# Patient Record
Sex: MALE | Race: WHITE | NOT HISPANIC OR LATINO
[De-identification: names, ages, dates, MRNs, and addresses within clinical notes are randomized per-mention and may not be internally consistent; named-entity substitution may affect disease eponyms.]

---

## 2017-01-12 ENCOUNTER — APPOINTMENT (OUTPATIENT)
Dept: NEUROLOGY | Facility: CLINIC | Age: 82
End: 2017-01-12

## 2017-01-12 ENCOUNTER — LABORATORY RESULT (OUTPATIENT)
Age: 82
End: 2017-01-12

## 2017-01-12 VITALS
HEIGHT: 72 IN | DIASTOLIC BLOOD PRESSURE: 83 MMHG | BODY MASS INDEX: 25.33 KG/M2 | SYSTOLIC BLOOD PRESSURE: 137 MMHG | OXYGEN SATURATION: 97 % | HEART RATE: 93 BPM | WEIGHT: 187 LBS

## 2017-01-12 DIAGNOSIS — Z86.39 PERSONAL HISTORY OF OTHER ENDOCRINE, NUTRITIONAL AND METABOLIC DISEASE: ICD-10-CM

## 2017-01-12 DIAGNOSIS — E83.119 HEMOCHROMATOSIS, UNSPECIFIED: ICD-10-CM

## 2017-01-12 DIAGNOSIS — Z86.2 PERSONAL HISTORY OF DISEASES OF THE BLOOD AND BLOOD-FORMING ORGANS AND CERTAIN DISORDERS INVOLVING THE IMMUNE MECHANISM: ICD-10-CM

## 2017-01-12 DIAGNOSIS — Z87.891 PERSONAL HISTORY OF NICOTINE DEPENDENCE: ICD-10-CM

## 2017-01-12 DIAGNOSIS — A04.7 ENTEROCOLITIS DUE TO CLOSTRIDIUM DIFFICILE: ICD-10-CM

## 2017-01-12 DIAGNOSIS — Z87.39 PERSONAL HISTORY OF OTHER DISEASES OF THE MUSCULOSKELETAL SYSTEM AND CONNECTIVE TISSUE: ICD-10-CM

## 2017-01-12 RX ORDER — TAMSULOSIN HYDROCHLORIDE 0.4 MG/1
0.4 CAPSULE ORAL
Refills: 0 | Status: ACTIVE | COMMUNITY

## 2017-01-13 LAB
ALBUMIN MFR SERPL ELPH: 61.4 %
ALBUMIN SERPL ELPH-MCNC: 4.3 G/DL
ALBUMIN SERPL-MCNC: 4.1 G/DL
ALBUMIN/GLOB SERPL: 1.6 RATIO
ALP BLD-CCNC: 73 U/L
ALPHA1 GLOB MFR SERPL ELPH: 4.6 %
ALPHA1 GLOB SERPL ELPH-MCNC: 0.3 G/DL
ALPHA2 GLOB MFR SERPL ELPH: 9 %
ALPHA2 GLOB SERPL ELPH-MCNC: 0.6 G/DL
ALT SERPL-CCNC: 32 U/L
ANION GAP SERPL CALC-SCNC: 16 MMOL/L
AST SERPL-CCNC: 49 U/L
B BURGDOR IGG+IGM SER QL IB: NORMAL
B-GLOBULIN MFR SERPL ELPH: 7.4 %
B-GLOBULIN SERPL ELPH-MCNC: 0.5 G/DL
BASOPHILS # BLD AUTO: 0.12 K/UL
BASOPHILS NFR BLD AUTO: 0.9 %
BILIRUB SERPL-MCNC: 1 MG/DL
BUN SERPL-MCNC: 26 MG/DL
CALCIUM SERPL-MCNC: 9.7 MG/DL
CHLORIDE SERPL-SCNC: 102 MMOL/L
CO2 SERPL-SCNC: 25 MMOL/L
CREAT SERPL-MCNC: 1.32 MG/DL
EOSINOPHIL # BLD AUTO: 0 K/UL
EOSINOPHIL NFR BLD AUTO: 0 %
FOLATE SERPL-MCNC: >20 NG/ML
GAMMA GLOB FLD ELPH-MCNC: 1.2 G/DL
GAMMA GLOB MFR SERPL ELPH: 17.6 %
GLUCOSE SERPL-MCNC: 108 MG/DL
HBA1C MFR BLD HPLC: 5.6 %
HCT VFR BLD CALC: 33.1 %
HGB BLD-MCNC: 10.4 G/DL
INTERPRETATION SERPL IEP-IMP: NORMAL
LYMPHOCYTES # BLD AUTO: 1.64 K/UL
LYMPHOCYTES NFR BLD AUTO: 12.7 %
M PROTEIN MFR SERPL ELPH: NORMAL %
M PROTEIN SPEC IFE-MCNC: NORMAL
MAN DIFF?: NORMAL
MCHC RBC-ENTMCNC: 30.6 PG
MCHC RBC-ENTMCNC: 31.4 GM/DL
MCV RBC AUTO: 97.4 FL
MONOCLON BAND OBS SERPL: NORMAL G/DL
MONOCYTES # BLD AUTO: 0.82 K/UL
MONOCYTES NFR BLD AUTO: 6.4 %
NEUTROPHILS # BLD AUTO: 10.2 K/UL
NEUTROPHILS NFR BLD AUTO: 70 %
PLATELET # BLD AUTO: 305 K/UL
POTASSIUM SERPL-SCNC: 4.9 MMOL/L
PROT SERPL-MCNC: 6.7 G/DL
RBC # BLD: 3.4 M/UL
RBC # FLD: 22.4 %
SODIUM SERPL-SCNC: 143 MMOL/L
T PALLIDUM AB SER QL IA: NEGATIVE
T3 SERPL-MCNC: 79 NG/DL
T3RU NFR SERPL: 0.92 INDEX
T4 SERPL-MCNC: 6.2 UG/DL
TSH SERPL-ACNC: 4.41 UIU/ML
VIT B12 SERPL-MCNC: 1639 PG/ML
WBC # FLD AUTO: 12.89 K/UL

## 2017-03-30 ENCOUNTER — APPOINTMENT (OUTPATIENT)
Dept: NEUROLOGY | Facility: CLINIC | Age: 82
End: 2017-03-30

## 2017-03-30 VITALS — SYSTOLIC BLOOD PRESSURE: 132 MMHG | DIASTOLIC BLOOD PRESSURE: 82 MMHG | OXYGEN SATURATION: 96 % | HEART RATE: 87 BPM

## 2017-03-30 DIAGNOSIS — T38.0X1A POISONING BY GLUCOCORTICOIDS AND SYNTHETIC ANALOGUES, ACCIDENTAL (UNINTENTIONAL), INITIAL ENCOUNTER: ICD-10-CM

## 2017-03-30 DIAGNOSIS — G60.9 HEREDITARY AND IDIOPATHIC NEUROPATHY, UNSPECIFIED: ICD-10-CM

## 2017-03-30 DIAGNOSIS — G72.0 POISONING BY GLUCOCORTICOIDS AND SYNTHETIC ANALOGUES, ACCIDENTAL (UNINTENTIONAL), INITIAL ENCOUNTER: ICD-10-CM

## 2017-03-30 RX ORDER — GABAPENTIN 100 MG/1
100 CAPSULE ORAL
Qty: 90 | Refills: 3 | Status: ACTIVE | COMMUNITY
Start: 2017-01-12 | End: 1900-01-01

## 2017-03-30 RX ORDER — LEVOTHYROXINE SODIUM 75 UG/1
75 TABLET ORAL
Refills: 0 | Status: ACTIVE | COMMUNITY

## 2017-04-11 ENCOUNTER — APPOINTMENT (OUTPATIENT)
Dept: SPINE | Facility: CLINIC | Age: 82
End: 2017-04-11

## 2017-04-11 VITALS
BODY MASS INDEX: 26.28 KG/M2 | HEIGHT: 72 IN | DIASTOLIC BLOOD PRESSURE: 70 MMHG | SYSTOLIC BLOOD PRESSURE: 124 MMHG | WEIGHT: 194 LBS

## 2017-04-11 RX ORDER — MULTIVITAMIN
TABLET ORAL
Refills: 0 | Status: ACTIVE | COMMUNITY

## 2017-04-11 RX ORDER — CHROMIUM 200 MCG
TABLET ORAL
Refills: 0 | Status: ACTIVE | COMMUNITY

## 2017-04-11 RX ORDER — ASCORBIC ACID 500 MG
TABLET ORAL
Refills: 0 | Status: ACTIVE | COMMUNITY

## 2017-04-11 RX ORDER — METRONIDAZOLE 500 MG/1
TABLET ORAL
Refills: 0 | Status: DISCONTINUED | COMMUNITY
End: 2017-04-11

## 2017-04-11 RX ORDER — PREDNISONE 5 MG/1
5 TABLET ORAL
Refills: 0 | Status: DISCONTINUED | COMMUNITY
End: 2017-04-11

## 2017-04-11 RX ORDER — CHOLESTYRAMINE 4 G/9G
POWDER, FOR SUSPENSION ORAL
Refills: 0 | Status: DISCONTINUED | COMMUNITY
End: 2017-04-11

## 2017-04-28 ENCOUNTER — OUTPATIENT (OUTPATIENT)
Dept: OUTPATIENT SERVICES | Facility: HOSPITAL | Age: 82
LOS: 1 days | End: 2017-04-28
Payer: MEDICARE

## 2017-04-28 VITALS
RESPIRATION RATE: 17 BRPM | TEMPERATURE: 97 F | DIASTOLIC BLOOD PRESSURE: 62 MMHG | HEIGHT: 70 IN | HEART RATE: 87 BPM | WEIGHT: 186.07 LBS | SYSTOLIC BLOOD PRESSURE: 130 MMHG | OXYGEN SATURATION: 98 %

## 2017-04-28 DIAGNOSIS — E83.119 HEMOCHROMATOSIS, UNSPECIFIED: ICD-10-CM

## 2017-04-28 DIAGNOSIS — G95.9 DISEASE OF SPINAL CORD, UNSPECIFIED: ICD-10-CM

## 2017-04-28 DIAGNOSIS — Z01.818 ENCOUNTER FOR OTHER PREPROCEDURAL EXAMINATION: ICD-10-CM

## 2017-04-28 DIAGNOSIS — E03.9 HYPOTHYROIDISM, UNSPECIFIED: ICD-10-CM

## 2017-04-28 DIAGNOSIS — Z90.89 ACQUIRED ABSENCE OF OTHER ORGANS: Chronic | ICD-10-CM

## 2017-04-28 DIAGNOSIS — M50.90 CERVICAL DISC DISORDER, UNSPECIFIED, UNSPECIFIED CERVICAL REGION: ICD-10-CM

## 2017-04-28 LAB
ANION GAP SERPL CALC-SCNC: 16 MMOL/L — SIGNIFICANT CHANGE UP (ref 5–17)
BLD GP AB SCN SERPL QL: NEGATIVE — SIGNIFICANT CHANGE UP
BUN SERPL-MCNC: 24 MG/DL — HIGH (ref 7–23)
CALCIUM SERPL-MCNC: 10.2 MG/DL — SIGNIFICANT CHANGE UP (ref 8.4–10.5)
CHLORIDE SERPL-SCNC: 103 MMOL/L — SIGNIFICANT CHANGE UP (ref 96–108)
CO2 SERPL-SCNC: 23 MMOL/L — SIGNIFICANT CHANGE UP (ref 22–31)
CREAT SERPL-MCNC: 1.44 MG/DL — HIGH (ref 0.5–1.3)
GLUCOSE SERPL-MCNC: 94 MG/DL — SIGNIFICANT CHANGE UP (ref 70–99)
HCT VFR BLD CALC: 32.4 % — LOW (ref 39–50)
HGB BLD-MCNC: 10.4 G/DL — LOW (ref 13–17)
MCHC RBC-ENTMCNC: 29.9 PG — SIGNIFICANT CHANGE UP (ref 27–34)
MCHC RBC-ENTMCNC: 32.1 GM/DL — SIGNIFICANT CHANGE UP (ref 32–36)
MCV RBC AUTO: 93.1 FL — SIGNIFICANT CHANGE UP (ref 80–100)
PLATELET # BLD AUTO: 266 K/UL — SIGNIFICANT CHANGE UP (ref 150–400)
POTASSIUM SERPL-MCNC: 5.2 MMOL/L — SIGNIFICANT CHANGE UP (ref 3.5–5.3)
POTASSIUM SERPL-SCNC: 5.2 MMOL/L — SIGNIFICANT CHANGE UP (ref 3.5–5.3)
RBC # BLD: 3.48 M/UL — LOW (ref 4.2–5.8)
RBC # FLD: 18.7 % — HIGH (ref 10.3–14.5)
RH IG SCN BLD-IMP: POSITIVE — SIGNIFICANT CHANGE UP
SODIUM SERPL-SCNC: 142 MMOL/L — SIGNIFICANT CHANGE UP (ref 135–145)
WBC # BLD: 7.39 K/UL — SIGNIFICANT CHANGE UP (ref 3.8–10.5)
WBC # FLD AUTO: 7.39 K/UL — SIGNIFICANT CHANGE UP (ref 3.8–10.5)

## 2017-04-28 PROCEDURE — G0463: CPT

## 2017-04-28 PROCEDURE — 86900 BLOOD TYPING SEROLOGIC ABO: CPT

## 2017-04-28 PROCEDURE — 80048 BASIC METABOLIC PNL TOTAL CA: CPT

## 2017-04-28 PROCEDURE — 85027 COMPLETE CBC AUTOMATED: CPT

## 2017-04-28 PROCEDURE — 86901 BLOOD TYPING SEROLOGIC RH(D): CPT

## 2017-04-28 PROCEDURE — 86850 RBC ANTIBODY SCREEN: CPT

## 2017-04-28 RX ORDER — CEFAZOLIN SODIUM 1 G
2000 VIAL (EA) INJECTION ONCE
Qty: 0 | Refills: 0 | Status: DISCONTINUED | OUTPATIENT
Start: 2017-05-11 | End: 2017-05-12

## 2017-04-28 NOTE — H&P PST ADULT - HISTORY OF PRESENT ILLNESS
83 y/o male H/O disease of spinal cord, Bilateral leg weakness & pain. Seen by MD. S/P MRI. Presents for C5-T1 anterior cervical  discectomy and Fusion. 81 y/o male H/O disease of cervical spinal cord, C/O Bilateral leg weakness & pain. H/O multiple falls, denies any injury , walks with walker. Seen by MD. S/P MRI. Presents for C5-T1 anterior cervical  discectomy and Fusion.    Pt and wife poor historian.

## 2017-04-28 NOTE — H&P PST ADULT - PROBLEM SELECTOR PLAN 1
C5-T1 Anterior Cervical Discectomy and Fusion  Check labs   Obtain copy of Medical eval pt is going 5/4/17

## 2017-04-28 NOTE — H&P PST ADULT - NSANTHOSAYNRD_GEN_A_CORE
No. CINTIA screening performed.  STOP BANG Legend: 0-2 = LOW Risk; 3-4 = INTERMEDIATE Risk; 5-8 = HIGH Risk

## 2017-04-28 NOTE — H&P PST ADULT - PMH
Acute gastric ulcer without hemorrhage or perforation  age 44  Benign prostatic hyperplasia, presence of lower urinary tract symptoms unspecified, unspecified morphology    Cardiac murmur    Cervical disc disease    Essential hypertension  in the past  Falls frequently    Hemochromatosis, unspecified hemochromatosis type  Pt states he has been getting Procrit injections for about 20 years, Last dose 4/27/17, Pt sees hematologist Dr Sawyer.  Hypothyroid    Kidney stones    Migraine    Neuropathy  b/l feet  OA (osteoarthritis)    Sciatica of left side

## 2017-05-11 ENCOUNTER — INPATIENT (INPATIENT)
Facility: HOSPITAL | Age: 82
LOS: 3 days | Discharge: HOME | DRG: 473 | End: 2017-05-15
Attending: NEUROLOGICAL SURGERY | Admitting: NEUROLOGICAL SURGERY
Payer: MEDICARE

## 2017-05-11 ENCOUNTER — APPOINTMENT (OUTPATIENT)
Dept: SPINE | Facility: HOSPITAL | Age: 82
End: 2017-05-11

## 2017-05-11 VITALS
WEIGHT: 188.94 LBS | SYSTOLIC BLOOD PRESSURE: 172 MMHG | HEIGHT: 72 IN | HEART RATE: 102 BPM | OXYGEN SATURATION: 97 % | DIASTOLIC BLOOD PRESSURE: 90 MMHG | TEMPERATURE: 98 F | RESPIRATION RATE: 18 BRPM

## 2017-05-11 DIAGNOSIS — G95.9 DISEASE OF SPINAL CORD, UNSPECIFIED: ICD-10-CM

## 2017-05-11 DIAGNOSIS — Z90.89 ACQUIRED ABSENCE OF OTHER ORGANS: Chronic | ICD-10-CM

## 2017-05-11 LAB — RH IG SCN BLD-IMP: POSITIVE — SIGNIFICANT CHANGE UP

## 2017-05-11 PROCEDURE — 22552 ARTHRD ANT NTRBD CERVICAL EA: CPT

## 2017-05-11 PROCEDURE — 22551 ARTHRD ANT NTRBDY CERVICAL: CPT

## 2017-05-11 PROCEDURE — 22853 INSJ BIOMECHANICAL DEVICE: CPT

## 2017-05-11 RX ORDER — DEXAMETHASONE 0.5 MG/5ML
4 ELIXIR ORAL EVERY 8 HOURS
Qty: 0 | Refills: 0 | Status: COMPLETED | OUTPATIENT
Start: 2017-05-11 | End: 2017-05-13

## 2017-05-11 RX ORDER — DOCUSATE SODIUM 100 MG
100 CAPSULE ORAL THREE TIMES A DAY
Qty: 0 | Refills: 0 | Status: DISCONTINUED | OUTPATIENT
Start: 2017-05-11 | End: 2017-05-15

## 2017-05-11 RX ORDER — HYDRALAZINE HCL 50 MG
10 TABLET ORAL ONCE
Qty: 0 | Refills: 0 | Status: COMPLETED | OUTPATIENT
Start: 2017-05-11 | End: 2017-05-11

## 2017-05-11 RX ORDER — SENNA PLUS 8.6 MG/1
2 TABLET ORAL AT BEDTIME
Qty: 0 | Refills: 0 | Status: DISCONTINUED | OUTPATIENT
Start: 2017-05-11 | End: 2017-05-15

## 2017-05-11 RX ORDER — LABETALOL HCL 100 MG
10 TABLET ORAL ONCE
Qty: 0 | Refills: 0 | Status: COMPLETED | OUTPATIENT
Start: 2017-05-11 | End: 2017-05-11

## 2017-05-11 RX ORDER — ACETAMINOPHEN 500 MG
650 TABLET ORAL EVERY 6 HOURS
Qty: 0 | Refills: 0 | Status: DISCONTINUED | OUTPATIENT
Start: 2017-05-11 | End: 2017-05-15

## 2017-05-11 RX ORDER — HYDROMORPHONE HYDROCHLORIDE 2 MG/ML
1 INJECTION INTRAMUSCULAR; INTRAVENOUS; SUBCUTANEOUS EVERY 4 HOURS
Qty: 0 | Refills: 0 | Status: DISCONTINUED | OUTPATIENT
Start: 2017-05-11 | End: 2017-05-13

## 2017-05-11 RX ORDER — SODIUM CHLORIDE 9 MG/ML
3 INJECTION INTRAMUSCULAR; INTRAVENOUS; SUBCUTANEOUS EVERY 8 HOURS
Qty: 0 | Refills: 0 | Status: DISCONTINUED | OUTPATIENT
Start: 2017-05-11 | End: 2017-05-11

## 2017-05-11 RX ORDER — CEFAZOLIN SODIUM 1 G
2000 VIAL (EA) INJECTION EVERY 8 HOURS
Qty: 0 | Refills: 0 | Status: COMPLETED | OUTPATIENT
Start: 2017-05-11 | End: 2017-05-12

## 2017-05-11 RX ORDER — ONDANSETRON 8 MG/1
4 TABLET, FILM COATED ORAL ONCE
Qty: 0 | Refills: 0 | Status: DISCONTINUED | OUTPATIENT
Start: 2017-05-11 | End: 2017-05-12

## 2017-05-11 RX ORDER — ACETAMINOPHEN 500 MG
650 TABLET ORAL EVERY 6 HOURS
Qty: 0 | Refills: 0 | Status: DISCONTINUED | OUTPATIENT
Start: 2017-05-11 | End: 2017-05-12

## 2017-05-11 RX ORDER — HYDROMORPHONE HYDROCHLORIDE 2 MG/ML
0.25 INJECTION INTRAMUSCULAR; INTRAVENOUS; SUBCUTANEOUS
Qty: 0 | Refills: 0 | Status: DISCONTINUED | OUTPATIENT
Start: 2017-05-11 | End: 2017-05-12

## 2017-05-11 RX ORDER — DEXTROSE MONOHYDRATE, SODIUM CHLORIDE, AND POTASSIUM CHLORIDE 50; .745; 4.5 G/1000ML; G/1000ML; G/1000ML
1000 INJECTION, SOLUTION INTRAVENOUS
Qty: 0 | Refills: 0 | Status: DISCONTINUED | OUTPATIENT
Start: 2017-05-11 | End: 2017-05-13

## 2017-05-11 RX ORDER — TAMSULOSIN HYDROCHLORIDE 0.4 MG/1
0.4 CAPSULE ORAL AT BEDTIME
Qty: 0 | Refills: 0 | Status: DISCONTINUED | OUTPATIENT
Start: 2017-05-11 | End: 2017-05-15

## 2017-05-11 RX ORDER — LEVOTHYROXINE SODIUM 125 MCG
75 TABLET ORAL DAILY
Qty: 0 | Refills: 0 | Status: DISCONTINUED | OUTPATIENT
Start: 2017-05-11 | End: 2017-05-15

## 2017-05-11 RX ADMIN — HYDROMORPHONE HYDROCHLORIDE 0.25 MILLIGRAM(S): 2 INJECTION INTRAMUSCULAR; INTRAVENOUS; SUBCUTANEOUS at 16:45

## 2017-05-11 RX ADMIN — DEXTROSE MONOHYDRATE, SODIUM CHLORIDE, AND POTASSIUM CHLORIDE 75 MILLILITER(S): 50; .745; 4.5 INJECTION, SOLUTION INTRAVENOUS at 18:00

## 2017-05-11 RX ADMIN — Medication 100 MILLIGRAM(S): at 22:10

## 2017-05-11 RX ADMIN — TAMSULOSIN HYDROCHLORIDE 0.4 MILLIGRAM(S): 0.4 CAPSULE ORAL at 22:10

## 2017-05-11 RX ADMIN — SENNA PLUS 2 TABLET(S): 8.6 TABLET ORAL at 22:10

## 2017-05-11 RX ADMIN — HYDROMORPHONE HYDROCHLORIDE 0.25 MILLIGRAM(S): 2 INJECTION INTRAMUSCULAR; INTRAVENOUS; SUBCUTANEOUS at 17:00

## 2017-05-11 RX ADMIN — Medication 10 MILLIGRAM(S): at 19:45

## 2017-05-11 RX ADMIN — Medication 4 MILLIGRAM(S): at 22:10

## 2017-05-11 RX ADMIN — Medication 10 MILLIGRAM(S): at 18:10

## 2017-05-11 RX ADMIN — Medication 10 MILLIGRAM(S): at 23:28

## 2017-05-11 RX ADMIN — Medication 100 MILLIGRAM(S): at 20:00

## 2017-05-12 ENCOUNTER — TRANSCRIPTION ENCOUNTER (OUTPATIENT)
Age: 82
End: 2017-05-12

## 2017-05-12 LAB
BUN SERPL-MCNC: 25 MG/DL — HIGH (ref 7–23)
CALCIUM SERPL-MCNC: 9.1 MG/DL — SIGNIFICANT CHANGE UP (ref 8.4–10.5)
CHLORIDE SERPL-SCNC: 105 MMOL/L — SIGNIFICANT CHANGE UP (ref 96–108)
CO2 SERPL-SCNC: 21 MMOL/L — LOW (ref 22–31)
CREAT SERPL-MCNC: 1.28 MG/DL — SIGNIFICANT CHANGE UP (ref 0.5–1.3)
GLUCOSE SERPL-MCNC: 161 MG/DL — HIGH (ref 70–99)
HCT VFR BLD CALC: 32.4 % — LOW (ref 39–50)
HGB BLD-MCNC: 10.8 G/DL — LOW (ref 13–17)
MAGNESIUM SERPL-MCNC: 1.8 MG/DL — SIGNIFICANT CHANGE UP (ref 1.6–2.6)
MCHC RBC-ENTMCNC: 32.2 PG — SIGNIFICANT CHANGE UP (ref 27–34)
MCHC RBC-ENTMCNC: 33.4 GM/DL — SIGNIFICANT CHANGE UP (ref 32–36)
MCV RBC AUTO: 96.5 FL — SIGNIFICANT CHANGE UP (ref 80–100)
PHOSPHATE SERPL-MCNC: 4.2 MG/DL — SIGNIFICANT CHANGE UP (ref 2.5–4.5)
PLATELET # BLD AUTO: 151 K/UL — SIGNIFICANT CHANGE UP (ref 150–400)
POTASSIUM SERPL-MCNC: 4.7 MMOL/L — SIGNIFICANT CHANGE UP (ref 3.5–5.3)
POTASSIUM SERPL-SCNC: 4.7 MMOL/L — SIGNIFICANT CHANGE UP (ref 3.5–5.3)
RBC # BLD: 3.35 M/UL — LOW (ref 4.2–5.8)
RBC # FLD: 19.1 % — HIGH (ref 10.3–14.5)
SODIUM SERPL-SCNC: 142 MMOL/L — SIGNIFICANT CHANGE UP (ref 135–145)
WBC # BLD: 3.6 K/UL — LOW (ref 3.8–10.5)
WBC # FLD AUTO: 3.6 K/UL — LOW (ref 3.8–10.5)

## 2017-05-12 PROCEDURE — 99233 SBSQ HOSP IP/OBS HIGH 50: CPT

## 2017-05-12 RX ORDER — INSULIN LISPRO 100/ML
VIAL (ML) SUBCUTANEOUS
Qty: 0 | Refills: 0 | Status: DISCONTINUED | OUTPATIENT
Start: 2017-05-12 | End: 2017-05-13

## 2017-05-12 RX ORDER — HEPARIN SODIUM 5000 [USP'U]/ML
5000 INJECTION INTRAVENOUS; SUBCUTANEOUS EVERY 12 HOURS
Qty: 0 | Refills: 0 | Status: DISCONTINUED | OUTPATIENT
Start: 2017-05-12 | End: 2017-05-15

## 2017-05-12 RX ADMIN — Medication 100 MILLIGRAM(S): at 15:11

## 2017-05-12 RX ADMIN — Medication 4 MILLIGRAM(S): at 06:04

## 2017-05-12 RX ADMIN — HYDROMORPHONE HYDROCHLORIDE 0.25 MILLIGRAM(S): 2 INJECTION INTRAMUSCULAR; INTRAVENOUS; SUBCUTANEOUS at 03:37

## 2017-05-12 RX ADMIN — Medication 100 MILLIGRAM(S): at 06:09

## 2017-05-12 RX ADMIN — HYDROMORPHONE HYDROCHLORIDE 0.25 MILLIGRAM(S): 2 INJECTION INTRAMUSCULAR; INTRAVENOUS; SUBCUTANEOUS at 03:53

## 2017-05-12 RX ADMIN — Medication 4 MILLIGRAM(S): at 21:38

## 2017-05-12 RX ADMIN — SENNA PLUS 2 TABLET(S): 8.6 TABLET ORAL at 21:38

## 2017-05-12 RX ADMIN — Medication 75 MICROGRAM(S): at 06:04

## 2017-05-12 RX ADMIN — Medication 100 MILLIGRAM(S): at 21:38

## 2017-05-12 RX ADMIN — Medication 100 MILLIGRAM(S): at 04:10

## 2017-05-12 RX ADMIN — DEXTROSE MONOHYDRATE, SODIUM CHLORIDE, AND POTASSIUM CHLORIDE 75 MILLILITER(S): 50; .745; 4.5 INJECTION, SOLUTION INTRAVENOUS at 09:23

## 2017-05-12 RX ADMIN — Medication 4 MILLIGRAM(S): at 15:11

## 2017-05-12 RX ADMIN — TAMSULOSIN HYDROCHLORIDE 0.4 MILLIGRAM(S): 0.4 CAPSULE ORAL at 21:38

## 2017-05-12 RX ADMIN — HEPARIN SODIUM 5000 UNIT(S): 5000 INJECTION INTRAVENOUS; SUBCUTANEOUS at 18:30

## 2017-05-12 NOTE — PHYSICAL THERAPY INITIAL EVALUATION ADULT - ADDITIONAL COMMENTS
Pt resides in private home with wife.  He and his wife reside on the 1st floor (1 TENA) and his daughter and grandchild live upstairs.  PTA pt was ambulating with a RW.  He also owns a RW. Pt resides in private home with wife.  He and his wife reside on the 1st floor (1 TENA) and his daughter and grandchild live upstairs.  PTA pt was ambulating with a RW.  He also owns a cane.

## 2017-05-12 NOTE — PHYSICAL THERAPY INITIAL EVALUATION ADULT - PERTINENT HX OF CURRENT PROBLEM, REHAB EVAL
83 y/o male H/O disease of cervical spinal cord, C/O Bilateral leg weakness & pain. H/O multiple falls, denies any injury , walks with walker. Seen by MD. S/P MRI. Presents for C5-T1 anterior cervical  discectomy and Fusion.

## 2017-05-13 LAB
ANION GAP SERPL CALC-SCNC: 12 MMOL/L — SIGNIFICANT CHANGE UP (ref 5–17)
BUN SERPL-MCNC: 31 MG/DL — HIGH (ref 7–23)
CALCIUM SERPL-MCNC: 9.4 MG/DL — SIGNIFICANT CHANGE UP (ref 8.4–10.5)
CHLORIDE SERPL-SCNC: 107 MMOL/L — SIGNIFICANT CHANGE UP (ref 96–108)
CO2 SERPL-SCNC: 20 MMOL/L — LOW (ref 22–31)
CREAT SERPL-MCNC: 1.21 MG/DL — SIGNIFICANT CHANGE UP (ref 0.5–1.3)
GLUCOSE SERPL-MCNC: 123 MG/DL — HIGH (ref 70–99)
HCT VFR BLD CALC: 31.9 % — LOW (ref 39–50)
HGB BLD-MCNC: 10.4 G/DL — LOW (ref 13–17)
MCHC RBC-ENTMCNC: 30 PG — SIGNIFICANT CHANGE UP (ref 27–34)
MCHC RBC-ENTMCNC: 32.6 GM/DL — SIGNIFICANT CHANGE UP (ref 32–36)
MCV RBC AUTO: 91.9 FL — SIGNIFICANT CHANGE UP (ref 80–100)
PLATELET # BLD AUTO: 197 K/UL — SIGNIFICANT CHANGE UP (ref 150–400)
POTASSIUM SERPL-MCNC: 4.7 MMOL/L — SIGNIFICANT CHANGE UP (ref 3.5–5.3)
POTASSIUM SERPL-SCNC: 4.7 MMOL/L — SIGNIFICANT CHANGE UP (ref 3.5–5.3)
RBC # BLD: 3.47 M/UL — LOW (ref 4.2–5.8)
RBC # FLD: 18.3 % — HIGH (ref 10.3–14.5)
SODIUM SERPL-SCNC: 139 MMOL/L — SIGNIFICANT CHANGE UP (ref 135–145)
WBC # BLD: 11.02 K/UL — HIGH (ref 3.8–10.5)
WBC # FLD AUTO: 11.02 K/UL — HIGH (ref 3.8–10.5)

## 2017-05-13 RX ORDER — SODIUM CHLORIDE 9 MG/ML
1000 INJECTION INTRAMUSCULAR; INTRAVENOUS; SUBCUTANEOUS
Qty: 0 | Refills: 0 | Status: DISCONTINUED | OUTPATIENT
Start: 2017-05-13 | End: 2017-05-14

## 2017-05-13 RX ORDER — SODIUM CHLORIDE 9 MG/ML
1000 INJECTION INTRAMUSCULAR; INTRAVENOUS; SUBCUTANEOUS
Qty: 0 | Refills: 0 | Status: DISCONTINUED | OUTPATIENT
Start: 2017-05-13 | End: 2017-05-13

## 2017-05-13 RX ADMIN — Medication 4 MILLIGRAM(S): at 05:40

## 2017-05-13 RX ADMIN — TAMSULOSIN HYDROCHLORIDE 0.4 MILLIGRAM(S): 0.4 CAPSULE ORAL at 22:35

## 2017-05-13 RX ADMIN — HEPARIN SODIUM 5000 UNIT(S): 5000 INJECTION INTRAVENOUS; SUBCUTANEOUS at 05:40

## 2017-05-13 RX ADMIN — Medication 100 MILLIGRAM(S): at 22:35

## 2017-05-13 RX ADMIN — Medication 100 MILLIGRAM(S): at 13:32

## 2017-05-13 RX ADMIN — Medication 4 MILLIGRAM(S): at 13:32

## 2017-05-13 RX ADMIN — Medication 75 MICROGRAM(S): at 05:40

## 2017-05-13 RX ADMIN — SENNA PLUS 2 TABLET(S): 8.6 TABLET ORAL at 22:35

## 2017-05-13 RX ADMIN — Medication 100 MILLIGRAM(S): at 05:40

## 2017-05-13 RX ADMIN — HEPARIN SODIUM 5000 UNIT(S): 5000 INJECTION INTRAVENOUS; SUBCUTANEOUS at 18:55

## 2017-05-13 RX ADMIN — DEXTROSE MONOHYDRATE, SODIUM CHLORIDE, AND POTASSIUM CHLORIDE 75 MILLILITER(S): 50; .745; 4.5 INJECTION, SOLUTION INTRAVENOUS at 13:32

## 2017-05-14 LAB
ANION GAP SERPL CALC-SCNC: 11 MMOL/L — SIGNIFICANT CHANGE UP (ref 5–17)
BUN SERPL-MCNC: 35 MG/DL — HIGH (ref 7–23)
CALCIUM SERPL-MCNC: 9.4 MG/DL — SIGNIFICANT CHANGE UP (ref 8.4–10.5)
CHLORIDE SERPL-SCNC: 108 MMOL/L — SIGNIFICANT CHANGE UP (ref 96–108)
CO2 SERPL-SCNC: 22 MMOL/L — SIGNIFICANT CHANGE UP (ref 22–31)
CREAT SERPL-MCNC: 1.15 MG/DL — SIGNIFICANT CHANGE UP (ref 0.5–1.3)
GLUCOSE SERPL-MCNC: 81 MG/DL — SIGNIFICANT CHANGE UP (ref 70–99)
POTASSIUM SERPL-MCNC: 4.2 MMOL/L — SIGNIFICANT CHANGE UP (ref 3.5–5.3)
POTASSIUM SERPL-SCNC: 4.2 MMOL/L — SIGNIFICANT CHANGE UP (ref 3.5–5.3)
SODIUM SERPL-SCNC: 141 MMOL/L — SIGNIFICANT CHANGE UP (ref 135–145)

## 2017-05-14 RX ADMIN — Medication 100 MILLIGRAM(S): at 05:31

## 2017-05-14 RX ADMIN — HEPARIN SODIUM 5000 UNIT(S): 5000 INJECTION INTRAVENOUS; SUBCUTANEOUS at 17:57

## 2017-05-14 RX ADMIN — Medication 75 MICROGRAM(S): at 05:31

## 2017-05-14 RX ADMIN — TAMSULOSIN HYDROCHLORIDE 0.4 MILLIGRAM(S): 0.4 CAPSULE ORAL at 21:02

## 2017-05-14 RX ADMIN — Medication 100 MILLIGRAM(S): at 13:47

## 2017-05-14 RX ADMIN — HEPARIN SODIUM 5000 UNIT(S): 5000 INJECTION INTRAVENOUS; SUBCUTANEOUS at 05:31

## 2017-05-15 ENCOUNTER — TRANSCRIPTION ENCOUNTER (OUTPATIENT)
Age: 82
End: 2017-05-15

## 2017-05-15 VITALS
DIASTOLIC BLOOD PRESSURE: 77 MMHG | TEMPERATURE: 98 F | OXYGEN SATURATION: 100 % | SYSTOLIC BLOOD PRESSURE: 110 MMHG | RESPIRATION RATE: 16 BRPM | HEART RATE: 88 BPM

## 2017-05-15 PROCEDURE — C1769: CPT

## 2017-05-15 PROCEDURE — 76000 FLUOROSCOPY <1 HR PHYS/QHP: CPT

## 2017-05-15 PROCEDURE — C1713: CPT

## 2017-05-15 PROCEDURE — 84100 ASSAY OF PHOSPHORUS: CPT

## 2017-05-15 PROCEDURE — 85027 COMPLETE CBC AUTOMATED: CPT

## 2017-05-15 PROCEDURE — 86901 BLOOD TYPING SEROLOGIC RH(D): CPT

## 2017-05-15 PROCEDURE — 97161 PT EVAL LOW COMPLEX 20 MIN: CPT

## 2017-05-15 PROCEDURE — C1889: CPT

## 2017-05-15 PROCEDURE — 86900 BLOOD TYPING SEROLOGIC ABO: CPT

## 2017-05-15 PROCEDURE — 83735 ASSAY OF MAGNESIUM: CPT

## 2017-05-15 PROCEDURE — 80048 BASIC METABOLIC PNL TOTAL CA: CPT

## 2017-05-15 RX ORDER — SENNA PLUS 8.6 MG/1
2 TABLET ORAL
Qty: 0 | Refills: 0 | COMMUNITY
Start: 2017-05-15

## 2017-05-15 RX ORDER — DOCUSATE SODIUM 100 MG
1 CAPSULE ORAL
Qty: 0 | Refills: 0 | COMMUNITY
Start: 2017-05-15

## 2017-05-15 RX ORDER — OXYCODONE HYDROCHLORIDE 5 MG/1
1 TABLET ORAL
Qty: 42 | Refills: 0 | OUTPATIENT
Start: 2017-05-15 | End: 2017-05-22

## 2017-05-15 RX ORDER — ACETAMINOPHEN 500 MG
2 TABLET ORAL
Qty: 0 | Refills: 0 | COMMUNITY
Start: 2017-05-15

## 2017-05-15 RX ORDER — ACETAMINOPHEN 500 MG
2 TABLET ORAL
Qty: 0 | Refills: 0 | COMMUNITY

## 2017-05-15 RX ORDER — AMLODIPINE BESYLATE 2.5 MG/1
2.5 TABLET ORAL DAILY
Qty: 0 | Refills: 0 | Status: DISCONTINUED | OUTPATIENT
Start: 2017-05-15 | End: 2017-05-15

## 2017-05-15 RX ADMIN — Medication 75 MICROGRAM(S): at 05:47

## 2017-05-15 RX ADMIN — HEPARIN SODIUM 5000 UNIT(S): 5000 INJECTION INTRAVENOUS; SUBCUTANEOUS at 05:49

## 2017-05-15 RX ADMIN — Medication 100 MILLIGRAM(S): at 05:47

## 2017-05-15 NOTE — DISCHARGE NOTE ADULT - MEDICATION SUMMARY - MEDICATIONS TO TAKE
I will START or STAY ON the medications listed below when I get home from the hospital:    acetaminophen 325 mg oral tablet  -- 2 tab(s) by mouth every 6 hours, As needed, Mild Pain (1 - 3)  -- Indication: For pain    acetaminophen-oxycodone 325 mg-5 mg oral tablet  -- 1 tab(s) by mouth every 4 hours, As needed, Moderate Pain MDD:6  -- Indication: For pain    tamsulosin 0.4 mg oral capsule  -- 1 cap(s) by mouth once a day (at bedtime)  -- Indication: For urinary retention    Procrit 40,000 units/mL preservative-free injectable solution  --  injectable every 2 weeks   -- Indication: For Blood agent     docusate sodium 100 mg oral capsule  -- 1 cap(s) by mouth 3 times a day  -- Indication: For Stool softner     senna oral tablet  -- 2 tab(s) by mouth once a day (at bedtime)  -- Indication: For Stool softner     levothyroxine 75 mcg (0.075 mg) oral capsule  -- 1 cap(s) by mouth once a day  -- Indication: For thyroid

## 2017-05-15 NOTE — DISCHARGE NOTE ADULT - ADDITIONAL INSTRUCTIONS
may take shower 4 days after surgery. Let water run over the surgical site and pat dry after shower.   If notices any new weakness, numbness, tingling, fever severe, neck swelling, difficulty swallowing,  pain then please come back to the hospital.

## 2017-05-15 NOTE — DISCHARGE NOTE ADULT - HOSPITAL COURSE
Patient had c5-t1 anterior cervical discectomy and fusion done on 5/11. Post op patient was admitted to the pacu and was then transferred to floor. Patient was given pain meds, ivf and was started back on routine home meds along with DVT prophylaxis. Post op patient was seen by physical therapy and was cleared for discharge. Patient's hemovac drain was dcd on 5/14. Patient was discharged home with follow up instructions.

## 2017-05-15 NOTE — DISCHARGE NOTE ADULT - REASON FOR ADMISSION
Spinal Stenosis in my neck Admitted on 5/11 with history of b/l leg weakness and pain. 5/11 patient had c5-t1 anterior cervical discectomy and fusion.

## 2017-05-15 NOTE — DISCHARGE NOTE ADULT - PLAN OF CARE
increase activity follow up with Dr. Sharma in 10 days-Please call Neurosurgeon office to confirm appointment   Follow up with primary care provider in 2 weeks

## 2017-05-15 NOTE — DISCHARGE NOTE ADULT - NS AS ACTIVITY OBS
Do not make important decisions/Walking-Indoors allowed/No Heavy lifting/straining/Walking-Outdoors allowed/Stairs allowed/Showering allowed/Do not drive or operate machinery

## 2017-05-15 NOTE — DISCHARGE NOTE ADULT - SECONDARY DIAGNOSIS.
Hypothyroid Cardiac murmur Benign prostatic hyperplasia, presence of lower urinary tract symptoms unspecified, unspecified morphology

## 2017-05-15 NOTE — DISCHARGE NOTE ADULT - CARE PLAN
Principal Discharge DX:	Cervical disc disease  Goal:	increase activity  Instructions for follow-up, activity and diet:	follow up with Dr. Sharma in 10 days-Please call Neurosurgeon office to confirm appointment   Follow up with primary care provider in 2 weeks  Secondary Diagnosis:	Hypothyroid  Secondary Diagnosis:	Cardiac murmur  Secondary Diagnosis:	Benign prostatic hyperplasia, presence of lower urinary tract symptoms unspecified, unspecified morphology

## 2017-05-15 NOTE — DISCHARGE NOTE ADULT - PATIENT PORTAL LINK FT
“You can access the Follow[facility] Patient Portal, offered by [facility], by registering with the following website: [email or web address]”

## 2017-05-15 NOTE — DISCHARGE NOTE ADULT - HOME CARE AGENCY
[facility] for home P/T for safety  [facility] care for home P/T  [phone]

## 2017-05-15 NOTE — DISCHARGE NOTE ADULT - CARE PROVIDER_API CALL
Kishor Sharma (MD), Neurological Surgery  [address] [place] [id number]  Phone: [phone]  Fax: [phone]

## 2017-05-18 ENCOUNTER — INPATIENT (INPATIENT)
Facility: HOSPITAL | Age: 82
LOS: 6 days | Discharge: HOME | DRG: 862 | End: 2017-05-25
Attending: INTERNAL MEDICINE | Admitting: ORTHOPAEDIC SURGERY
Payer: MEDICARE

## 2017-05-18 VITALS
DIASTOLIC BLOOD PRESSURE: 74 MMHG | OXYGEN SATURATION: 96 % | RESPIRATION RATE: 20 BRPM | SYSTOLIC BLOOD PRESSURE: 134 MMHG | TEMPERATURE: 101 F | HEART RATE: 115 BPM

## 2017-05-18 DIAGNOSIS — S72.009A FRACTURE OF UNSPECIFIED PART OF NECK OF UNSPECIFIED FEMUR, INITIAL ENCOUNTER FOR CLOSED FRACTURE: ICD-10-CM

## 2017-05-18 DIAGNOSIS — Z90.89 ACQUIRED ABSENCE OF OTHER ORGANS: Chronic | ICD-10-CM

## 2017-05-18 LAB
ALBUMIN SERPL ELPH-MCNC: 3.9 G/DL — SIGNIFICANT CHANGE UP (ref 3.3–5)
ALP SERPL-CCNC: 76 U/L — SIGNIFICANT CHANGE UP (ref 40–120)
ALT FLD-CCNC: 16 U/L RC — SIGNIFICANT CHANGE UP (ref 10–45)
ANION GAP SERPL CALC-SCNC: 12 MMOL/L — SIGNIFICANT CHANGE UP (ref 5–17)
ANISOCYTOSIS BLD QL: SIGNIFICANT CHANGE UP
APTT BLD: 28.1 SEC — SIGNIFICANT CHANGE UP (ref 27.5–37.4)
AST SERPL-CCNC: 25 U/L — SIGNIFICANT CHANGE UP (ref 10–40)
BASE EXCESS BLDV CALC-SCNC: 2.3 MMOL/L — HIGH (ref -2–2)
BASOPHILS # BLD AUTO: 0.1 K/UL — SIGNIFICANT CHANGE UP (ref 0–0.2)
BASOPHILS NFR BLD AUTO: 0.6 % — SIGNIFICANT CHANGE UP (ref 0–2)
BILIRUB SERPL-MCNC: 0.7 MG/DL — SIGNIFICANT CHANGE UP (ref 0.2–1.2)
BUN SERPL-MCNC: 34 MG/DL — HIGH (ref 7–23)
CA-I SERPL-SCNC: 1.31 MMOL/L — HIGH (ref 1.12–1.3)
CALCIUM SERPL-MCNC: 9.8 MG/DL — SIGNIFICANT CHANGE UP (ref 8.4–10.5)
CHLORIDE BLDV-SCNC: 104 MMOL/L — SIGNIFICANT CHANGE UP (ref 96–108)
CHLORIDE SERPL-SCNC: 103 MMOL/L — SIGNIFICANT CHANGE UP (ref 96–108)
CO2 BLDV-SCNC: 28 MMOL/L — SIGNIFICANT CHANGE UP (ref 22–30)
CO2 SERPL-SCNC: 25 MMOL/L — SIGNIFICANT CHANGE UP (ref 22–31)
CREAT SERPL-MCNC: 1.34 MG/DL — HIGH (ref 0.5–1.3)
DACRYOCYTES BLD QL SMEAR: SLIGHT — SIGNIFICANT CHANGE UP
ELLIPTOCYTES BLD QL SMEAR: SLIGHT — SIGNIFICANT CHANGE UP
EOSINOPHIL # BLD AUTO: 0 K/UL — SIGNIFICANT CHANGE UP (ref 0–0.5)
EOSINOPHIL NFR BLD AUTO: 0.3 % — SIGNIFICANT CHANGE UP (ref 0–6)
GAS PNL BLDV: 139 MMOL/L — SIGNIFICANT CHANGE UP (ref 136–145)
GAS PNL BLDV: SIGNIFICANT CHANGE UP
GAS PNL BLDV: SIGNIFICANT CHANGE UP
GLUCOSE BLDV-MCNC: 123 MG/DL — HIGH (ref 70–99)
GLUCOSE SERPL-MCNC: 123 MG/DL — HIGH (ref 70–99)
HCO3 BLDV-SCNC: 27 MMOL/L — SIGNIFICANT CHANGE UP (ref 21–29)
HCT VFR BLD CALC: 29.1 % — LOW (ref 39–50)
HCT VFR BLDA CALC: 35 % — LOW (ref 39–50)
HGB BLD CALC-MCNC: 11.3 G/DL — LOW (ref 13–17)
HGB BLD-MCNC: 9.4 G/DL — LOW (ref 13–17)
HYPOCHROMIA BLD QL: SLIGHT — SIGNIFICANT CHANGE UP
INR BLD: 1.13 RATIO — SIGNIFICANT CHANGE UP (ref 0.88–1.16)
LACTATE BLDV-MCNC: 1 MMOL/L — SIGNIFICANT CHANGE UP (ref 0.7–2)
LYMPHOCYTES # BLD AUTO: 1.3 K/UL — SIGNIFICANT CHANGE UP (ref 1–3.3)
LYMPHOCYTES # BLD AUTO: 9.3 % — LOW (ref 13–44)
MACROCYTES BLD QL: SLIGHT — SIGNIFICANT CHANGE UP
MCHC RBC-ENTMCNC: 31.2 PG — SIGNIFICANT CHANGE UP (ref 27–34)
MCHC RBC-ENTMCNC: 32.2 GM/DL — SIGNIFICANT CHANGE UP (ref 32–36)
MCV RBC AUTO: 96.8 FL — SIGNIFICANT CHANGE UP (ref 80–100)
MICROCYTES BLD QL: SLIGHT — SIGNIFICANT CHANGE UP
MONOCYTES # BLD AUTO: 1.7 K/UL — HIGH (ref 0–0.9)
MONOCYTES NFR BLD AUTO: 12.5 % — SIGNIFICANT CHANGE UP (ref 2–14)
NEUTROPHILS # BLD AUTO: 10.6 K/UL — HIGH (ref 1.8–7.4)
NEUTROPHILS NFR BLD AUTO: 77.3 % — HIGH (ref 43–77)
OVALOCYTES BLD QL SMEAR: SLIGHT — SIGNIFICANT CHANGE UP
PCO2 BLDV: 44 MMHG — SIGNIFICANT CHANGE UP (ref 35–50)
PH BLDV: 7.41 — SIGNIFICANT CHANGE UP (ref 7.35–7.45)
PLAT MORPH BLD: NORMAL — SIGNIFICANT CHANGE UP
PLATELET # BLD AUTO: 184 K/UL — SIGNIFICANT CHANGE UP (ref 150–400)
PO2 BLDV: 32 MMHG — SIGNIFICANT CHANGE UP (ref 25–45)
POLYCHROMASIA BLD QL SMEAR: SLIGHT — SIGNIFICANT CHANGE UP
POTASSIUM BLDV-SCNC: 4.2 MMOL/L — SIGNIFICANT CHANGE UP (ref 3.5–5)
POTASSIUM SERPL-MCNC: 4.4 MMOL/L — SIGNIFICANT CHANGE UP (ref 3.5–5.3)
POTASSIUM SERPL-SCNC: 4.4 MMOL/L — SIGNIFICANT CHANGE UP (ref 3.5–5.3)
PROT SERPL-MCNC: 6.5 G/DL — SIGNIFICANT CHANGE UP (ref 6–8.3)
PROTHROM AB SERPL-ACNC: 12.3 SEC — SIGNIFICANT CHANGE UP (ref 9.8–12.7)
RBC # BLD: 3 M/UL — LOW (ref 4.2–5.8)
RBC # FLD: 19.2 % — HIGH (ref 10.3–14.5)
RBC BLD AUTO: ABNORMAL
SAO2 % BLDV: 52 % — LOW (ref 67–88)
SCHISTOCYTES BLD QL AUTO: SLIGHT — SIGNIFICANT CHANGE UP
SODIUM SERPL-SCNC: 140 MMOL/L — SIGNIFICANT CHANGE UP (ref 135–145)
WBC # BLD: 13.8 K/UL — HIGH (ref 3.8–10.5)
WBC # FLD AUTO: 13.8 K/UL — HIGH (ref 3.8–10.5)

## 2017-05-18 PROCEDURE — 72125 CT NECK SPINE W/O DYE: CPT | Mod: 26

## 2017-05-18 PROCEDURE — 99285 EMERGENCY DEPT VISIT HI MDM: CPT | Mod: 25

## 2017-05-18 PROCEDURE — 93010 ELECTROCARDIOGRAM REPORT: CPT

## 2017-05-18 PROCEDURE — 70450 CT HEAD/BRAIN W/O DYE: CPT | Mod: 26

## 2017-05-18 RX ORDER — SODIUM CHLORIDE 9 MG/ML
500 INJECTION INTRAMUSCULAR; INTRAVENOUS; SUBCUTANEOUS
Qty: 0 | Refills: 0 | Status: COMPLETED | OUTPATIENT
Start: 2017-05-18 | End: 2017-05-18

## 2017-05-18 RX ORDER — SODIUM CHLORIDE 9 MG/ML
3 INJECTION INTRAMUSCULAR; INTRAVENOUS; SUBCUTANEOUS ONCE
Qty: 0 | Refills: 0 | Status: COMPLETED | OUTPATIENT
Start: 2017-05-18 | End: 2017-05-18

## 2017-05-18 RX ADMIN — SODIUM CHLORIDE 3 MILLILITER(S): 9 INJECTION INTRAMUSCULAR; INTRAVENOUS; SUBCUTANEOUS at 21:34

## 2017-05-18 RX ADMIN — SODIUM CHLORIDE 2000 MILLILITER(S): 9 INJECTION INTRAMUSCULAR; INTRAVENOUS; SUBCUTANEOUS at 22:01

## 2017-05-18 RX ADMIN — SODIUM CHLORIDE 2000 MILLILITER(S): 9 INJECTION INTRAMUSCULAR; INTRAVENOUS; SUBCUTANEOUS at 21:59

## 2017-05-18 RX ADMIN — SODIUM CHLORIDE 2000 MILLILITER(S): 9 INJECTION INTRAMUSCULAR; INTRAVENOUS; SUBCUTANEOUS at 22:00

## 2017-05-18 RX ADMIN — SODIUM CHLORIDE 2000 MILLILITER(S): 9 INJECTION INTRAMUSCULAR; INTRAVENOUS; SUBCUTANEOUS at 21:44

## 2017-05-18 RX ADMIN — SODIUM CHLORIDE 2000 MILLILITER(S): 9 INJECTION INTRAMUSCULAR; INTRAVENOUS; SUBCUTANEOUS at 21:34

## 2017-05-18 NOTE — ED ADULT NURSE NOTE - OBJECTIVE STATEMENT
82 year old male patient presents to ED c/o R shoulder pain s/p witnessed fall in the bathroom. Patient was using the walker when his knees gave out and fel fell backward. Patient hit his head on a plastic toilet seat. Denies LOC, headache, blurred vision, chest pain, SOB, abd pain, n/v/d. Patient had recent cervical disc surgery on 5/11, and was d/c home on Monday. Steri strips present to incision to L anterior neck, dry and intact. Patient reports increased weakness and tactile fevers. Denies drainage or bleeding from site.

## 2017-05-18 NOTE — ED PROVIDER NOTE - NS ED MD SCRIBE ATTENDING SCRIBE SECTIONS
DISPOSITION/REVIEW OF SYSTEMS/HIV/PHYSICAL EXAM/PAST MEDICAL/SURGICAL/SOCIAL HISTORY/HISTORY OF PRESENT ILLNESS

## 2017-05-18 NOTE — ED PROVIDER NOTE - MEDICAL DECISION MAKING DETAILS
Patient with left shoulder pain. PLAN: CT head and neck to evaluate fall r/o STH and any occult injury of the new cervical fusion. Otherwise Will obtain IV x 2, cbc, cmp, +/-ce, VBG with lactate at time 0, fluid bolus @ 30cc/kg initiated upon departure from the room on initial assessment within 30 minutes of arrival, urine analysis and blood cultures x 2 are obtained prior to antibiotics =>prior to administration of antibiotics goal within 3 hours of arrival, and will repeat lactate with VBG if the original is elevated and do so within 6 hours of initial lactate.

## 2017-05-18 NOTE — ED PROVIDER NOTE - PHYSICAL EXAMINATION
NAD, NCAT, MMM, Trachea midline, Normal conjunctiva, CTAB, Non-tachycardic, Normal perfusion, Soft, NTND, No rebound/guarding, No deformity of extremities, Appropriate, Cooperative, With capacity and insight, No rashes, CN grossly intact, Normal coordination, No focal motor or sensory deficits

## 2017-05-18 NOTE — ED PROVIDER NOTE - CONSTITUTIONAL, MLM
normal... Pale, awake, alert, oriented to person, place, time/situation and in no apparent distress.

## 2017-05-18 NOTE — ED PROVIDER NOTE - CARE PLAN
Principal Discharge DX:	Hip fracture Principal Discharge DX:	Fever  Secondary Diagnosis:	Tachycardia

## 2017-05-18 NOTE — ED PROVIDER NOTE - DETAILS:
Oscar Casarez MD note: The scribe's documentation has been prepared under my direction and personally reviewed by me.  I confirm that the note above accurately reflects my work, treatment, procedures, and medical decision making.

## 2017-05-18 NOTE — ED PROVIDER NOTE - PROGRESS NOTE DETAILS
lung deviated to the right   limited film due to rotation Oscar Casarez MD: Patient with right hip fracture, orthopedics accepts, Dr. Villafuerte of medicine aware, will admit.  Will follow up on labs, analgesia, any clinical imaging, reassess and disposition as clinically indicated.  Details of patient and plan conveyed to receiving physician and conveyed back for understanding.  There were no questions at this time about the patients disposition and plan. Patient's care to be taken over by receiving physician at this time, all decisions regarding the progression of care will be made at their discretion.

## 2017-05-18 NOTE — ED PROVIDER NOTE - OBJECTIVE STATEMENT
82 year old male with PMHx with many medical issues presents to ED c/o s/p surgery and was discharged this Monday, Admits to pain in the left shoulder. Had a fall from the shower and fell on his back. Has difficulty breathing. No cough, nausea or vomiting. Not on any blood thinners. Had LE edema which pt admits had worsen.

## 2017-05-19 DIAGNOSIS — I10 ESSENTIAL (PRIMARY) HYPERTENSION: ICD-10-CM

## 2017-05-19 DIAGNOSIS — R50.9 FEVER, UNSPECIFIED: ICD-10-CM

## 2017-05-19 DIAGNOSIS — M50.90 CERVICAL DISC DISORDER, UNSPECIFIED, UNSPECIFIED CERVICAL REGION: ICD-10-CM
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
## 2017-05-19 DIAGNOSIS — Z29.9 ENCOUNTER FOR PROPHYLACTIC MEASURES, UNSPECIFIED: ICD-10-CM

## 2017-05-19 DIAGNOSIS — N40.0 BENIGN PROSTATIC HYPERPLASIA WITHOUT LOWER URINARY TRACT SYMPTOMS: ICD-10-CM
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
## 2017-05-19 DIAGNOSIS — E83.119 HEMOCHROMATOSIS, UNSPECIFIED: ICD-10-CM

## 2017-05-19 DIAGNOSIS — E03.9 HYPOTHYROIDISM, UNSPECIFIED: ICD-10-CM

## 2017-05-19 LAB
ANION GAP SERPL CALC-SCNC: 9 MMOL/L — SIGNIFICANT CHANGE UP (ref 5–17)
APPEARANCE UR: CLEAR — SIGNIFICANT CHANGE UP
BASOPHILS # BLD AUTO: 0.1 K/UL — SIGNIFICANT CHANGE UP (ref 0–0.2)
BASOPHILS NFR BLD AUTO: 0.7 % — SIGNIFICANT CHANGE UP (ref 0–2)
BILIRUB UR-MCNC: NEGATIVE — SIGNIFICANT CHANGE UP
BUN SERPL-MCNC: 30 MG/DL — HIGH (ref 7–23)
CALCIUM SERPL-MCNC: 8.5 MG/DL — SIGNIFICANT CHANGE UP (ref 8.4–10.5)
CHLORIDE SERPL-SCNC: 106 MMOL/L — SIGNIFICANT CHANGE UP (ref 96–108)
CK MB BLD-MCNC: 8.3 % — HIGH (ref 0–3.5)
CK MB CFR SERPL CALC: 2 NG/ML — SIGNIFICANT CHANGE UP (ref 0–6.7)
CK SERPL-CCNC: 24 U/L — LOW (ref 30–200)
CO2 SERPL-SCNC: 24 MMOL/L — SIGNIFICANT CHANGE UP (ref 22–31)
COLOR SPEC: YELLOW — SIGNIFICANT CHANGE UP
CREAT SERPL-MCNC: 1.24 MG/DL — SIGNIFICANT CHANGE UP (ref 0.5–1.3)
DIFF PNL FLD: NEGATIVE — SIGNIFICANT CHANGE UP
EOSINOPHIL # BLD AUTO: 0.1 K/UL — SIGNIFICANT CHANGE UP (ref 0–0.5)
EOSINOPHIL NFR BLD AUTO: 0.7 % — SIGNIFICANT CHANGE UP (ref 0–6)
EPI CELLS # UR: SIGNIFICANT CHANGE UP /HPF
GLUCOSE SERPL-MCNC: 114 MG/DL — HIGH (ref 70–99)
GLUCOSE UR QL: NEGATIVE — SIGNIFICANT CHANGE UP
HBA1C BLD-MCNC: 5.4 % — SIGNIFICANT CHANGE UP (ref 4–5.6)
HCT VFR BLD CALC: 27 % — LOW (ref 39–50)
HGB BLD-MCNC: 9 G/DL — LOW (ref 13–17)
KETONES UR-MCNC: NEGATIVE — SIGNIFICANT CHANGE UP
LEUKOCYTE ESTERASE UR-ACNC: NEGATIVE — SIGNIFICANT CHANGE UP
LYMPHOCYTES # BLD AUTO: 15.6 % — SIGNIFICANT CHANGE UP (ref 13–44)
LYMPHOCYTES # BLD AUTO: 2.1 K/UL — SIGNIFICANT CHANGE UP (ref 1–3.3)
MAGNESIUM SERPL-MCNC: 1.8 MG/DL — SIGNIFICANT CHANGE UP (ref 1.6–2.6)
MCHC RBC-ENTMCNC: 32.5 PG — SIGNIFICANT CHANGE UP (ref 27–34)
MCHC RBC-ENTMCNC: 33.4 GM/DL — SIGNIFICANT CHANGE UP (ref 32–36)
MCV RBC AUTO: 97.4 FL — SIGNIFICANT CHANGE UP (ref 80–100)
MONOCYTES # BLD AUTO: 2.4 K/UL — HIGH (ref 0–0.9)
MONOCYTES NFR BLD AUTO: 17.8 % — HIGH (ref 2–14)
NEUTROPHILS # BLD AUTO: 8.8 K/UL — HIGH (ref 1.8–7.4)
NEUTROPHILS NFR BLD AUTO: 65.2 % — SIGNIFICANT CHANGE UP (ref 43–77)
NITRITE UR-MCNC: NEGATIVE — SIGNIFICANT CHANGE UP
PH UR: 6.5 — SIGNIFICANT CHANGE UP (ref 5–8)
PHOSPHATE SERPL-MCNC: 2.6 MG/DL — SIGNIFICANT CHANGE UP (ref 2.5–4.5)
PLATELET # BLD AUTO: 147 K/UL — LOW (ref 150–400)
POTASSIUM SERPL-MCNC: 4 MMOL/L — SIGNIFICANT CHANGE UP (ref 3.5–5.3)
POTASSIUM SERPL-SCNC: 4 MMOL/L — SIGNIFICANT CHANGE UP (ref 3.5–5.3)
PROT UR-MCNC: SIGNIFICANT CHANGE UP
RAPID RVP RESULT: SIGNIFICANT CHANGE UP
RBC # BLD: 2.77 M/UL — LOW (ref 4.2–5.8)
RBC # FLD: 18.9 % — HIGH (ref 10.3–14.5)
SODIUM SERPL-SCNC: 139 MMOL/L — SIGNIFICANT CHANGE UP (ref 135–145)
SP GR SPEC: 1.01 — SIGNIFICANT CHANGE UP (ref 1.01–1.02)
TROPONIN T SERPL-MCNC: 0.03 NG/ML — SIGNIFICANT CHANGE UP (ref 0–0.06)
UROBILINOGEN FLD QL: NEGATIVE — SIGNIFICANT CHANGE UP
WBC # BLD: 13.5 K/UL — HIGH (ref 3.8–10.5)
WBC # FLD AUTO: 13.5 K/UL — HIGH (ref 3.8–10.5)
WBC UR QL: SIGNIFICANT CHANGE UP /HPF (ref 0–5)

## 2017-05-19 PROCEDURE — 99223 1ST HOSP IP/OBS HIGH 75: CPT | Mod: GC

## 2017-05-19 PROCEDURE — 71010: CPT | Mod: 26

## 2017-05-19 PROCEDURE — 71275 CT ANGIOGRAPHY CHEST: CPT | Mod: 26

## 2017-05-19 PROCEDURE — 93970 EXTREMITY STUDY: CPT | Mod: 26

## 2017-05-19 PROCEDURE — 12345: CPT | Mod: GC,NC

## 2017-05-19 RX ORDER — ENOXAPARIN SODIUM 100 MG/ML
80 INJECTION SUBCUTANEOUS EVERY 12 HOURS
Qty: 0 | Refills: 0 | Status: DISCONTINUED | OUTPATIENT
Start: 2017-05-19 | End: 2017-05-20

## 2017-05-19 RX ORDER — DEXTROSE 50 % IN WATER 50 %
25 SYRINGE (ML) INTRAVENOUS ONCE
Qty: 0 | Refills: 0 | Status: DISCONTINUED | OUTPATIENT
Start: 2017-05-19 | End: 2017-05-21

## 2017-05-19 RX ORDER — DEXTROSE 50 % IN WATER 50 %
1 SYRINGE (ML) INTRAVENOUS ONCE
Qty: 0 | Refills: 0 | Status: DISCONTINUED | OUTPATIENT
Start: 2017-05-19 | End: 2017-05-21

## 2017-05-19 RX ORDER — PIPERACILLIN AND TAZOBACTAM 4; .5 G/20ML; G/20ML
3.38 INJECTION, POWDER, LYOPHILIZED, FOR SOLUTION INTRAVENOUS EVERY 8 HOURS
Qty: 0 | Refills: 0 | Status: DISCONTINUED | OUTPATIENT
Start: 2017-05-19 | End: 2017-05-19

## 2017-05-19 RX ORDER — SENNA PLUS 8.6 MG/1
2 TABLET ORAL AT BEDTIME
Qty: 0 | Refills: 0 | Status: DISCONTINUED | OUTPATIENT
Start: 2017-05-19 | End: 2017-05-23

## 2017-05-19 RX ORDER — TAMSULOSIN HYDROCHLORIDE 0.4 MG/1
0.4 CAPSULE ORAL AT BEDTIME
Qty: 0 | Refills: 0 | Status: DISCONTINUED | OUTPATIENT
Start: 2017-05-19 | End: 2017-05-25

## 2017-05-19 RX ORDER — POLYETHYLENE GLYCOL 3350 17 G/17G
17 POWDER, FOR SOLUTION ORAL
Qty: 0 | Refills: 0 | COMMUNITY

## 2017-05-19 RX ORDER — ACETAMINOPHEN 500 MG
650 TABLET ORAL EVERY 6 HOURS
Qty: 0 | Refills: 0 | Status: DISCONTINUED | OUTPATIENT
Start: 2017-05-19 | End: 2017-05-23

## 2017-05-19 RX ORDER — ERYTHROPOIETIN 10000 [IU]/ML
40000 INJECTION, SOLUTION INTRAVENOUS; SUBCUTANEOUS ONCE
Qty: 0 | Refills: 0 | Status: COMPLETED | OUTPATIENT
Start: 2017-05-19 | End: 2017-05-19

## 2017-05-19 RX ORDER — LEVOTHYROXINE SODIUM 125 MCG
75 TABLET ORAL DAILY
Qty: 0 | Refills: 0 | Status: DISCONTINUED | OUTPATIENT
Start: 2017-05-19 | End: 2017-05-25

## 2017-05-19 RX ORDER — INSULIN LISPRO 100/ML
VIAL (ML) SUBCUTANEOUS
Qty: 0 | Refills: 0 | Status: DISCONTINUED | OUTPATIENT
Start: 2017-05-19 | End: 2017-05-21

## 2017-05-19 RX ORDER — HEPARIN SODIUM 5000 [USP'U]/ML
5000 INJECTION INTRAVENOUS; SUBCUTANEOUS EVERY 8 HOURS
Qty: 0 | Refills: 0 | Status: DISCONTINUED | OUTPATIENT
Start: 2017-05-19 | End: 2017-05-19

## 2017-05-19 RX ORDER — SODIUM CHLORIDE 9 MG/ML
1000 INJECTION, SOLUTION INTRAVENOUS
Qty: 0 | Refills: 0 | Status: DISCONTINUED | OUTPATIENT
Start: 2017-05-19 | End: 2017-05-21

## 2017-05-19 RX ORDER — ACETAMINOPHEN 500 MG
1000 TABLET ORAL ONCE
Qty: 0 | Refills: 0 | Status: COMPLETED | OUTPATIENT
Start: 2017-05-19 | End: 2017-05-19

## 2017-05-19 RX ORDER — POLYETHYLENE GLYCOL 3350 17 G/17G
17 POWDER, FOR SOLUTION ORAL DAILY
Qty: 0 | Refills: 0 | Status: DISCONTINUED | OUTPATIENT
Start: 2017-05-19 | End: 2017-05-23

## 2017-05-19 RX ORDER — MORPHINE SULFATE 50 MG/1
2 CAPSULE, EXTENDED RELEASE ORAL ONCE
Qty: 0 | Refills: 0 | Status: DISCONTINUED | OUTPATIENT
Start: 2017-05-19 | End: 2017-05-19

## 2017-05-19 RX ORDER — GLUCAGON INJECTION, SOLUTION 0.5 MG/.1ML
1 INJECTION, SOLUTION SUBCUTANEOUS ONCE
Qty: 0 | Refills: 0 | Status: DISCONTINUED | OUTPATIENT
Start: 2017-05-19 | End: 2017-05-23

## 2017-05-19 RX ORDER — DOCUSATE SODIUM 100 MG
100 CAPSULE ORAL THREE TIMES A DAY
Qty: 0 | Refills: 0 | Status: DISCONTINUED | OUTPATIENT
Start: 2017-05-19 | End: 2017-05-23

## 2017-05-19 RX ORDER — DEXTROSE 50 % IN WATER 50 %
12.5 SYRINGE (ML) INTRAVENOUS ONCE
Qty: 0 | Refills: 0 | Status: DISCONTINUED | OUTPATIENT
Start: 2017-05-19 | End: 2017-05-21

## 2017-05-19 RX ORDER — PIPERACILLIN AND TAZOBACTAM 4; .5 G/20ML; G/20ML
3.38 INJECTION, POWDER, LYOPHILIZED, FOR SOLUTION INTRAVENOUS ONCE
Qty: 0 | Refills: 0 | Status: COMPLETED | OUTPATIENT
Start: 2017-05-19 | End: 2017-05-19

## 2017-05-19 RX ORDER — MORPHINE SULFATE 50 MG/1
4 CAPSULE, EXTENDED RELEASE ORAL ONCE
Qty: 0 | Refills: 0 | Status: DISCONTINUED | OUTPATIENT
Start: 2017-05-19 | End: 2017-05-19

## 2017-05-19 RX ADMIN — HEPARIN SODIUM 5000 UNIT(S): 5000 INJECTION INTRAVENOUS; SUBCUTANEOUS at 05:01

## 2017-05-19 RX ADMIN — Medication 100 MILLIGRAM(S): at 15:44

## 2017-05-19 RX ADMIN — TAMSULOSIN HYDROCHLORIDE 0.4 MILLIGRAM(S): 0.4 CAPSULE ORAL at 22:47

## 2017-05-19 RX ADMIN — PIPERACILLIN AND TAZOBACTAM 25 GRAM(S): 4; .5 INJECTION, POWDER, LYOPHILIZED, FOR SOLUTION INTRAVENOUS at 11:42

## 2017-05-19 RX ADMIN — Medication 75 MICROGRAM(S): at 05:01

## 2017-05-19 RX ADMIN — PIPERACILLIN AND TAZOBACTAM 200 GRAM(S): 4; .5 INJECTION, POWDER, LYOPHILIZED, FOR SOLUTION INTRAVENOUS at 02:56

## 2017-05-19 RX ADMIN — Medication 100 MILLIGRAM(S): at 05:01

## 2017-05-19 RX ADMIN — ERYTHROPOIETIN 40000 UNIT(S): 10000 INJECTION, SOLUTION INTRAVENOUS; SUBCUTANEOUS at 18:02

## 2017-05-19 RX ADMIN — POLYETHYLENE GLYCOL 3350 17 GRAM(S): 17 POWDER, FOR SOLUTION ORAL at 15:44

## 2017-05-19 RX ADMIN — Medication 400 MILLIGRAM(S): at 01:10

## 2017-05-19 RX ADMIN — ENOXAPARIN SODIUM 80 MILLIGRAM(S): 100 INJECTION SUBCUTANEOUS at 18:01

## 2017-05-19 RX ADMIN — MORPHINE SULFATE 2 MILLIGRAM(S): 50 CAPSULE, EXTENDED RELEASE ORAL at 01:10

## 2017-05-19 NOTE — H&P ADULT. - NEUROLOGICAL DETAILS
cranial nerves intact/responds to verbal commands/sensation intact/alert and oriented x 3/responds to pain/deep reflexes intact

## 2017-05-19 NOTE — H&P ADULT. - MUSCULOSKELETAL
details… detailed exam no joint erythema/no joint warmth/ROM intact/no joint swelling/no calf tenderness/normal strength

## 2017-05-19 NOTE — H&P ADULT. - NEGATIVE OPHTHALMOLOGIC SYMPTOMS
no blurred vision L/no blurred vision R/no photophobia/no diplopia/no lacrimation L/no lacrimation R

## 2017-05-19 NOTE — H&P ADULT. - HISTORY OF PRESENT ILLNESS
82M HTN, hypothyroid, hemachromatosis (with procrit injections), neuropathy, BPH, cervical disc disease (s/p anterior cervical diskectomy with fusion on recent admission on 5/15/2017), multiple falls, presents with weakness, fevers, chills, pre-syncopal fall with ensuing head trauma.     In ED: T: 101.6  BP: 134/74  HR: 106  RR: 20  Spo2: 96%RA. Patient provided 500cc NS, RVP pending, BCx sent. Neurosurgery consulted. 82M HTN, hypothyroid, hemachromatosis (with procrit injections), neuropathy, BPH, cervical disc disease (s/p anterior cervical diskectomy with fusion on recent admission on 5/15/2017), multiple falls, presents with weakness, fevers, chills, pre-syncopal fall with ensuing head trauma. Patient states that after he was discharged from the hospital he has not been feeling himself. He states that he has pain in his neck which restricts his ROM and has paraspinal muscle tenderness below the level of the neck. The patient also further endorses chronic leg pain, L > R and states that he has developed a "numb" foot on the left side. Of note, the patient has noticed that his left leg has been swelling since his surgery, and this is new for him. Patient denies any sick contacts, travel, fevers, chills, N/V/D/C, cough, nasal congestion, dysuria, or urinary frequency. Patient was in the shower this morning, and when he was walking out of the bathroom, he fell and hit his head. At no point did he lose consciousness and he has full recollection of the entire event. Patient openly admits that he is prone to falling and this has happened multiple times in the past. Patient states that he currently uses a walker, and his goal is to get back to using a cane.     In ED: T: 101.6  BP: 134/74  HR: 106  RR: 20  Spo2: 96%RA. Patient provided 500cc NS, RVP pending, BCx sent. Neurosurgery consulted.

## 2017-05-19 NOTE — H&P ADULT. - NEGATIVE GASTROINTESTINAL SYMPTOMS
no abdominal pain/no nausea/no vomiting/no change in bowel habits/no constipation/no hematochezia/no melena

## 2017-05-19 NOTE — H&P ADULT. - PROBLEM SELECTOR PLAN 2
Patient POD # 4  - Need to consider DVT, UTI, PNA as causes of post operative fever  - Incentive spirometry. Patient has been using percocet for pain control. Need to r/o atelectasis vs PNA. Currently no cough, sputum production, hypoxia, tachypnea to suggest pulm source  - patient has been having LE swelling, unilaterally L > R. Check dopplers to r/o DVT which can cause fevers  - check bladder scan and check UA with UCx to r/o urinary source  - Surgical site appears clean, dry, intact, with no signs of infection

## 2017-05-19 NOTE — H&P ADULT. - PROBLEM SELECTOR PLAN 1
Patient has h/o falls for multiple reasons including but not limited to sciatica, neuropathy, recent surgery with opiate use  - Patient has full recollection of fall. No concern for syncope  - CThead and CT spine demonstrate no intracranial process and no cervical fracture  - PT consult

## 2017-05-19 NOTE — H&P ADULT. - PROBLEM SELECTOR PLAN 5
Patient currently at goal < 150/90  - No medications prescribed at home. Will not add any at this point

## 2017-05-19 NOTE — H&P ADULT. - ASSESSMENT
82M HTN, hypothyroid, hemachromatosis (with procrit injections), neuropathy, BPH, cervical disc disease (s/p anterior cervical diskectomy with fusion on recent admission on 5/15/2017), multiple falls, presents with weakness, fevers, chills, pre-syncopal fall in the context lower extremity swelling.

## 2017-05-19 NOTE — H&P ADULT. - LAB RESULTS AND INTERPRETATION
Labs reviewed and interpreted personally: Leukocytosis to 13.8 with neutrophil predominance. H/H at baseline. Platelets normal. coags unremarkable. No electrolyte abnormalities. Creatinine stable 1.34 (1.15). LFT's unremarkable. Lactate 1.0.

## 2017-05-19 NOTE — INPATIENT CERTIFICATION FOR MEDICARE PATIENTS - RISKS OF ADVERSE EVENTS
Concern for cardiopulmonary deterioration/Concern for worsening infectious process/Concern for delay in diagnosis and treatment/Concern for renal deterioration

## 2017-05-19 NOTE — H&P ADULT. - PROBLEM SELECTOR PLAN 6
Patient does not endorse any urinary hesitancy, frequency, or polyuria  - bladder scan to r/o retention. If retention, will give TOV  - will check UA and urine Cx to r/o possibility of Urinary source of infection  - c/w flomax

## 2017-05-19 NOTE — H&P ADULT. - NEGATIVE MUSCULOSKELETAL SYMPTOMS
no stiffness/no arthralgia/no muscle cramps/no joint swelling/no myalgia/no arthritis/no arm pain L/no arm pain R

## 2017-05-19 NOTE — H&P ADULT. - ATTENDING COMMENTS
Will need to clarify with hematologist/PMD why patient is on procrit.  May be for chronic anemia that was leading to hemochromatosis distantly? Will need to clarify with hematologist/PMD why patient is on procrit.  May be for chronic anemia that was leading to hemochromatosis distantly?  Patient most likely with pneumonia, suspect aspiration pneumonia given location and recent procedure w/intubation.  Will cover with zosyn for now, obtain PA/Lateral chest films.

## 2017-05-19 NOTE — H&P ADULT. - RADIOLOGY RESULTS AND INTERPRETATION
Images reviewed and interpreted personally: CT head demonstrates no acute intracranial process. CT c-spine demonstrates osteopenia with fracture.

## 2017-05-19 NOTE — H&P ADULT. - RS GEN PE MLT RESP DETAILS PC
no intercostal retractions/no rhonchi/good air movement/no wheezes/no rales/breath sounds equal/airway patent/no chest wall tenderness/respirations non-labored/clear to auscultation bilaterally

## 2017-05-19 NOTE — H&P ADULT. - NEGATIVE ENMT SYMPTOMS
no ear pain/no nasal obstruction/no vertigo/no nasal discharge/no nasal congestion/no sinus symptoms/no tinnitus

## 2017-05-19 NOTE — H&P ADULT. - PROBLEM SELECTOR PLAN 4
Patient has been diagnosed with hemachromatosis since 2000 and has been getting monthly lab work with procrit injections every 2 weeks or so. Last procrit injection was 3 weeks ago  - Can contact patient's PMD and see if this needs be done as inpatient  - check iron studies  - Will check A1c given elevated glucose and risk for diabetes with this disease

## 2017-05-19 NOTE — H&P ADULT. - LYMPHATIC
posterior cervical L/anterior cervical L/supraclavicular L/supraclavicular R/anterior cervical R/posterior cervical R

## 2017-05-19 NOTE — H&P ADULT. - PROBLEM SELECTOR PLAN 3
Patient POD 4 of anterior diskectomy with fusion  - will continue with tylenol for mild to moderate pain and percocet for severe pain  - bowel regimen for opiate use

## 2017-05-20 LAB
ANION GAP SERPL CALC-SCNC: 19 MMOL/L — HIGH (ref 5–17)
BUN SERPL-MCNC: 24 MG/DL — HIGH (ref 7–23)
CALCIUM SERPL-MCNC: 8.6 MG/DL — SIGNIFICANT CHANGE UP (ref 8.4–10.5)
CHLORIDE SERPL-SCNC: 102 MMOL/L — SIGNIFICANT CHANGE UP (ref 96–108)
CO2 SERPL-SCNC: 17 MMOL/L — LOW (ref 22–31)
CREAT SERPL-MCNC: 1.19 MG/DL — SIGNIFICANT CHANGE UP (ref 0.5–1.3)
CULTURE RESULTS: NO GROWTH — SIGNIFICANT CHANGE UP
FERRITIN SERPL-MCNC: 607 NG/ML — HIGH (ref 30–400)
GLUCOSE SERPL-MCNC: 87 MG/DL — SIGNIFICANT CHANGE UP (ref 70–99)
HCT VFR BLD CALC: 27.6 % — LOW (ref 39–50)
HGB BLD-MCNC: 9.1 G/DL — LOW (ref 13–17)
IRON SATN MFR SERPL: 20 % — SIGNIFICANT CHANGE UP (ref 16–55)
IRON SATN MFR SERPL: 27 UG/DL — LOW (ref 45–165)
MCHC RBC-ENTMCNC: 30.3 PG — SIGNIFICANT CHANGE UP (ref 27–34)
MCHC RBC-ENTMCNC: 33 GM/DL — SIGNIFICANT CHANGE UP (ref 32–36)
MCV RBC AUTO: 92 FL — SIGNIFICANT CHANGE UP (ref 80–100)
PLATELET # BLD AUTO: 163 K/UL — SIGNIFICANT CHANGE UP (ref 150–400)
POTASSIUM SERPL-MCNC: 3.9 MMOL/L — SIGNIFICANT CHANGE UP (ref 3.5–5.3)
POTASSIUM SERPL-SCNC: 3.9 MMOL/L — SIGNIFICANT CHANGE UP (ref 3.5–5.3)
RBC # BLD: 3 M/UL — LOW (ref 4.2–5.8)
RBC # FLD: 18.5 % — HIGH (ref 10.3–14.5)
SODIUM SERPL-SCNC: 138 MMOL/L — SIGNIFICANT CHANGE UP (ref 135–145)
SPECIMEN SOURCE: SIGNIFICANT CHANGE UP
T3 SERPL-MCNC: 55 NG/DL — LOW (ref 80–200)
T4 AB SER-ACNC: 4.8 UG/DL — SIGNIFICANT CHANGE UP (ref 4.6–12)
TIBC SERPL-MCNC: 132 UG/DL — LOW (ref 220–430)
TROPONIN T SERPL-MCNC: 0.03 NG/ML — SIGNIFICANT CHANGE UP (ref 0–0.06)
TSH SERPL-MCNC: 1.73 UIU/ML — SIGNIFICANT CHANGE UP (ref 0.27–4.2)
UIBC SERPL-MCNC: 105 UG/DL — LOW (ref 110–370)
WBC # BLD: 24.1 K/UL — HIGH (ref 3.8–10.5)
WBC # FLD AUTO: 24.1 K/UL — HIGH (ref 3.8–10.5)

## 2017-05-20 PROCEDURE — 99233 SBSQ HOSP IP/OBS HIGH 50: CPT | Mod: GC

## 2017-05-20 RX ORDER — ENOXAPARIN SODIUM 100 MG/ML
40 INJECTION SUBCUTANEOUS DAILY
Qty: 0 | Refills: 0 | Status: DISCONTINUED | OUTPATIENT
Start: 2017-05-20 | End: 2017-05-25

## 2017-05-20 RX ORDER — MOXIFLOXACIN HYDROCHLORIDE TABLETS, 400 MG 400 MG/1
400 TABLET, FILM COATED ORAL DAILY
Qty: 0 | Refills: 0 | Status: COMPLETED | OUTPATIENT
Start: 2017-05-20 | End: 2017-05-24

## 2017-05-20 RX ADMIN — Medication 75 MICROGRAM(S): at 06:14

## 2017-05-20 RX ADMIN — TAMSULOSIN HYDROCHLORIDE 0.4 MILLIGRAM(S): 0.4 CAPSULE ORAL at 21:18

## 2017-05-20 RX ADMIN — ENOXAPARIN SODIUM 40 MILLIGRAM(S): 100 INJECTION SUBCUTANEOUS at 12:09

## 2017-05-20 RX ADMIN — MOXIFLOXACIN HYDROCHLORIDE TABLETS, 400 MG 400 MILLIGRAM(S): 400 TABLET, FILM COATED ORAL at 13:12

## 2017-05-20 RX ADMIN — ENOXAPARIN SODIUM 80 MILLIGRAM(S): 100 INJECTION SUBCUTANEOUS at 06:14

## 2017-05-20 NOTE — OCCUPATIONAL THERAPY INITIAL EVALUATION ADULT - PERTINENT HX OF CURRENT PROBLEM, REHAB EVAL
82 M p/w weakness, fevers, chills, pre-syncopal fall with ensuing head trauma. Pt states that after he was d/c he has not been feeling himself. He states that he has pain in his neck which restricts his ROM and has paraspinal muscle tenderness below the level of the neck. See below 82 M p/w weakness, fevers, chills, pre-syncopal fall with ensuing head trauma. Pt states that after he was d/c he has not been feeling himself. He states that he has pain in his neck which restricts his ROM and has paraspinal muscle tenderness below the level of the neck. (See below)

## 2017-05-20 NOTE — OCCUPATIONAL THERAPY INITIAL EVALUATION ADULT - MANUAL MUSCLE TESTING RESULTS, REHAB EVAL
grossly assessed atleast 3/5 BUE, 3-/5 R hip/knee, 3/5 R ankle, 3-/5 L hip, 2/5 L knee, 2-/5 L ankle

## 2017-05-20 NOTE — OCCUPATIONAL THERAPY INITIAL EVALUATION ADULT - PLANNED THERAPY INTERVENTIONS, OT EVAL
ROM/motor coordination training/bed mobility training/ADL retraining/strengthening/transfer training/balance training

## 2017-05-20 NOTE — OCCUPATIONAL THERAPY INITIAL EVALUATION ADULT - ADDITIONAL COMMENTS
Pt also further endorses chronic leg pain, L > R and states that he has developed a "numb" foot on the L side. Pt was in the shower this morning, and when he was walking out of the bathroom, he fell and hit his head.  BLE Doppler: (-)  Head CT: No acute intracranial hemorrhage, extra axial fluid collection or displaced skull fracture. If clinically indicated, a short-term follow-up or an MRI may be obtained for further evaluation.    Cervical spine CT: Postsurgical and degenerative changes of the cervical spine. Diffuse osteopenia without obvious fracture or traumatic malalignment in the cervical spine. If clinically indicated, an MRI may be obtained for further evaluation. Pt also further endorses chronic leg pain, L > R and states that he has developed a "numb" foot on the L side. Pt was in the shower this morning, and when he was walking out of the bathroom, he fell and hit his head. BLE Doppler: (-); Head CT: No acute intracranial hemorrhage, extra axial fluid collection or displaced skull fracture.  Cervical spine CT: Postsurgical and degenerative changes of the cervical spine. Diffuse osteopenia without obvious fracture or traumatic malalignment in the cervical spine. CTA chest (-) for pulmonary embolism, +trace b/l pleural effusions

## 2017-05-21 LAB
ANION GAP SERPL CALC-SCNC: 14 MMOL/L — SIGNIFICANT CHANGE UP (ref 5–17)
BASOPHILS # BLD AUTO: 0.07 K/UL — SIGNIFICANT CHANGE UP (ref 0–0.2)
BASOPHILS NFR BLD AUTO: 0.8 % — SIGNIFICANT CHANGE UP (ref 0–2)
BUN SERPL-MCNC: 24 MG/DL — HIGH (ref 7–23)
CALCIUM SERPL-MCNC: 8.7 MG/DL — SIGNIFICANT CHANGE UP (ref 8.4–10.5)
CHLORIDE SERPL-SCNC: 106 MMOL/L — SIGNIFICANT CHANGE UP (ref 96–108)
CO2 SERPL-SCNC: 21 MMOL/L — LOW (ref 22–31)
CREAT SERPL-MCNC: 1.2 MG/DL — SIGNIFICANT CHANGE UP (ref 0.5–1.3)
EOSINOPHIL # BLD AUTO: 0.36 K/UL — SIGNIFICANT CHANGE UP (ref 0–0.5)
EOSINOPHIL NFR BLD AUTO: 4.3 % — SIGNIFICANT CHANGE UP (ref 0–6)
GLUCOSE SERPL-MCNC: 89 MG/DL — SIGNIFICANT CHANGE UP (ref 70–99)
HCT VFR BLD CALC: 26.6 % — LOW (ref 39–50)
HGB BLD-MCNC: 8.5 G/DL — LOW (ref 13–17)
IMM GRANULOCYTES NFR BLD AUTO: 0.4 % — SIGNIFICANT CHANGE UP (ref 0–1.5)
LYMPHOCYTES # BLD AUTO: 2.11 K/UL — SIGNIFICANT CHANGE UP (ref 1–3.3)
LYMPHOCYTES # BLD AUTO: 25.5 % — SIGNIFICANT CHANGE UP (ref 13–44)
MAGNESIUM SERPL-MCNC: 1.7 MG/DL — SIGNIFICANT CHANGE UP (ref 1.6–2.6)
MCHC RBC-ENTMCNC: 29 PG — SIGNIFICANT CHANGE UP (ref 27–34)
MCHC RBC-ENTMCNC: 32 GM/DL — SIGNIFICANT CHANGE UP (ref 32–36)
MCV RBC AUTO: 90.8 FL — SIGNIFICANT CHANGE UP (ref 80–100)
MONOCYTES # BLD AUTO: 0.94 K/UL — HIGH (ref 0–0.9)
MONOCYTES NFR BLD AUTO: 11.4 % — SIGNIFICANT CHANGE UP (ref 2–14)
NEUTROPHILS # BLD AUTO: 4.77 K/UL — SIGNIFICANT CHANGE UP (ref 1.8–7.4)
NEUTROPHILS NFR BLD AUTO: 57.6 % — SIGNIFICANT CHANGE UP (ref 43–77)
PHOSPHATE SERPL-MCNC: 2.9 MG/DL — SIGNIFICANT CHANGE UP (ref 2.5–4.5)
PLATELET # BLD AUTO: 200 K/UL — SIGNIFICANT CHANGE UP (ref 150–400)
POTASSIUM SERPL-MCNC: 3.8 MMOL/L — SIGNIFICANT CHANGE UP (ref 3.5–5.3)
POTASSIUM SERPL-SCNC: 3.8 MMOL/L — SIGNIFICANT CHANGE UP (ref 3.5–5.3)
PROCALCITONIN SERPL-MCNC: 0.28 NG/ML — HIGH (ref 0–0.05)
RBC # BLD: 2.93 M/UL — LOW (ref 4.2–5.8)
RBC # FLD: 18.4 % — HIGH (ref 10.3–14.5)
SODIUM SERPL-SCNC: 141 MMOL/L — SIGNIFICANT CHANGE UP (ref 135–145)
WBC # BLD: 8.28 K/UL — SIGNIFICANT CHANGE UP (ref 3.8–10.5)
WBC # FLD AUTO: 8.28 K/UL — SIGNIFICANT CHANGE UP (ref 3.8–10.5)

## 2017-05-21 PROCEDURE — 99232 SBSQ HOSP IP/OBS MODERATE 35: CPT | Mod: GC

## 2017-05-21 RX ADMIN — TAMSULOSIN HYDROCHLORIDE 0.4 MILLIGRAM(S): 0.4 CAPSULE ORAL at 21:32

## 2017-05-21 RX ADMIN — ENOXAPARIN SODIUM 40 MILLIGRAM(S): 100 INJECTION SUBCUTANEOUS at 12:43

## 2017-05-21 RX ADMIN — MOXIFLOXACIN HYDROCHLORIDE TABLETS, 400 MG 400 MILLIGRAM(S): 400 TABLET, FILM COATED ORAL at 12:43

## 2017-05-21 RX ADMIN — Medication 75 MICROGRAM(S): at 05:23

## 2017-05-21 NOTE — PHYSICAL THERAPY INITIAL EVALUATION ADULT - GAIT DEVIATIONS NOTED, PT EVAL
decreased step length/decreased stride length/decreased leandro/decreased velocity of limb motion/decreased weight-shifting ability/flexed posture,

## 2017-05-21 NOTE — PHYSICAL THERAPY INITIAL EVALUATION ADULT - ADDITIONAL COMMENTS
At no point did he lose consciousness and he has full recollection of the entire event. Pt reporting, neck pain which restricts his ROM and has paraspinal muscle tenderness below the level of the neck. Pt noticed his left leg has been swelling since surgery.  Pt admits to multiple falls in the past. CT Angio No pulmonary embolus. Trace bilateral pleural effusions. BLE duplex (-). CXR Right perihilar opacity likely represents prominent pulmonary vasculature. Otherwise, clear lungs. CT head (-). CT C-spine Postsurgical and degenerative changes of the cervical spine. Diffuse osteopenia without obvious fracture or traumatic malalignment in the cervical spine. Heterogenous thyroid gland.

## 2017-05-21 NOTE — PHYSICAL THERAPY INITIAL EVALUATION ADULT - PERTINENT HX OF CURRENT PROBLEM, REHAB EVAL
82yoM, pmhx below. p/w weakness, fevers, chills, pre-syncopal fall with ensuing head trauma. Pt s/p anterior cervical discectomy with fusion on recent admission on 5/15/2017. Pt reporting he was in the shower this morning, and when he was walking out of the bathroom, he fell and hit his head.

## 2017-05-21 NOTE — PHYSICAL THERAPY INITIAL EVALUATION ADULT - TRANSFER SAFETY CONCERNS NOTED: SIT/STAND, REHAB EVAL
decreased safety awareness/decreased balance during turns/flexed posture/decreased sequencing ability/decreased weight-shifting ability/decreased step length

## 2017-05-21 NOTE — PHYSICAL THERAPY INITIAL EVALUATION ADULT - LIVES WITH, PROFILE
Pt lives with spouse in a private house, 1 step to enter, no additional steps inside. PTA, pt independent with all ADLs and functional activities with RW. Pt admitting to multiple falls in the past

## 2017-05-22 ENCOUNTER — APPOINTMENT (OUTPATIENT)
Dept: SPINE | Facility: CLINIC | Age: 82
End: 2017-05-22

## 2017-05-22 ENCOUNTER — OTHER (OUTPATIENT)
Age: 82
End: 2017-05-22

## 2017-05-22 ENCOUNTER — TRANSCRIPTION ENCOUNTER (OUTPATIENT)
Age: 82
End: 2017-05-22

## 2017-05-22 LAB
ANION GAP SERPL CALC-SCNC: 16 MMOL/L — SIGNIFICANT CHANGE UP (ref 5–17)
BASOPHILS # BLD AUTO: 0.04 K/UL — SIGNIFICANT CHANGE UP (ref 0–0.2)
BASOPHILS NFR BLD AUTO: 0.6 % — SIGNIFICANT CHANGE UP (ref 0–2)
BUN SERPL-MCNC: 26 MG/DL — HIGH (ref 7–23)
C DIFF GDH STL QL: NEGATIVE — SIGNIFICANT CHANGE UP
C DIFF GDH STL QL: SIGNIFICANT CHANGE UP
CALCIUM SERPL-MCNC: 8.5 MG/DL — SIGNIFICANT CHANGE UP (ref 8.4–10.5)
CHLORIDE SERPL-SCNC: 103 MMOL/L — SIGNIFICANT CHANGE UP (ref 96–108)
CO2 SERPL-SCNC: 21 MMOL/L — LOW (ref 22–31)
CREAT SERPL-MCNC: 1.34 MG/DL — HIGH (ref 0.5–1.3)
EOSINOPHIL # BLD AUTO: 0.28 K/UL — SIGNIFICANT CHANGE UP (ref 0–0.5)
EOSINOPHIL NFR BLD AUTO: 4.1 % — SIGNIFICANT CHANGE UP (ref 0–6)
GLUCOSE SERPL-MCNC: 101 MG/DL — HIGH (ref 70–99)
HCT VFR BLD CALC: 25.3 % — LOW (ref 39–50)
HGB BLD-MCNC: 8.4 G/DL — LOW (ref 13–17)
IMM GRANULOCYTES NFR BLD AUTO: 0.4 % — SIGNIFICANT CHANGE UP (ref 0–1.5)
LYMPHOCYTES # BLD AUTO: 1.82 K/UL — SIGNIFICANT CHANGE UP (ref 1–3.3)
LYMPHOCYTES # BLD AUTO: 26.6 % — SIGNIFICANT CHANGE UP (ref 13–44)
MAGNESIUM SERPL-MCNC: 1.7 MG/DL — SIGNIFICANT CHANGE UP (ref 1.6–2.6)
MCHC RBC-ENTMCNC: 29.9 PG — SIGNIFICANT CHANGE UP (ref 27–34)
MCHC RBC-ENTMCNC: 33.2 GM/DL — SIGNIFICANT CHANGE UP (ref 32–36)
MCV RBC AUTO: 90 FL — SIGNIFICANT CHANGE UP (ref 80–100)
MONOCYTES # BLD AUTO: 0.93 K/UL — HIGH (ref 0–0.9)
MONOCYTES NFR BLD AUTO: 13.6 % — SIGNIFICANT CHANGE UP (ref 2–14)
NEUTROPHILS # BLD AUTO: 3.73 K/UL — SIGNIFICANT CHANGE UP (ref 1.8–7.4)
NEUTROPHILS NFR BLD AUTO: 54.7 % — SIGNIFICANT CHANGE UP (ref 43–77)
PHOSPHATE SERPL-MCNC: 2.8 MG/DL — SIGNIFICANT CHANGE UP (ref 2.5–4.5)
PLATELET # BLD AUTO: 195 K/UL — SIGNIFICANT CHANGE UP (ref 150–400)
POTASSIUM SERPL-MCNC: 3.9 MMOL/L — SIGNIFICANT CHANGE UP (ref 3.5–5.3)
POTASSIUM SERPL-SCNC: 3.9 MMOL/L — SIGNIFICANT CHANGE UP (ref 3.5–5.3)
PROCALCITONIN SERPL-MCNC: 0.05 NG/ML — SIGNIFICANT CHANGE UP (ref 0–0.05)
RBC # BLD: 2.81 M/UL — LOW (ref 4.2–5.8)
RBC # FLD: 18.4 % — HIGH (ref 10.3–14.5)
SODIUM SERPL-SCNC: 140 MMOL/L — SIGNIFICANT CHANGE UP (ref 135–145)
WBC # BLD: 6.83 K/UL — SIGNIFICANT CHANGE UP (ref 3.8–10.5)
WBC # FLD AUTO: 6.83 K/UL — SIGNIFICANT CHANGE UP (ref 3.8–10.5)

## 2017-05-22 PROCEDURE — 99232 SBSQ HOSP IP/OBS MODERATE 35: CPT | Mod: GC

## 2017-05-22 PROCEDURE — 93010 ELECTROCARDIOGRAM REPORT: CPT

## 2017-05-22 PROCEDURE — 76536 US EXAM OF HEAD AND NECK: CPT | Mod: 26

## 2017-05-22 RX ADMIN — TAMSULOSIN HYDROCHLORIDE 0.4 MILLIGRAM(S): 0.4 CAPSULE ORAL at 23:02

## 2017-05-22 RX ADMIN — Medication 75 MICROGRAM(S): at 05:13

## 2017-05-22 RX ADMIN — Medication 100 MILLIGRAM(S): at 23:03

## 2017-05-22 RX ADMIN — SENNA PLUS 2 TABLET(S): 8.6 TABLET ORAL at 23:03

## 2017-05-22 RX ADMIN — MOXIFLOXACIN HYDROCHLORIDE TABLETS, 400 MG 400 MILLIGRAM(S): 400 TABLET, FILM COATED ORAL at 12:51

## 2017-05-22 RX ADMIN — ENOXAPARIN SODIUM 40 MILLIGRAM(S): 100 INJECTION SUBCUTANEOUS at 12:51

## 2017-05-22 NOTE — DISCHARGE NOTE ADULT - MEDICATION SUMMARY - MEDICATIONS TO TAKE
I will START or STAY ON the medications listed below when I get home from the hospital:    acetaminophen-oxycodone 325 mg-5 mg oral tablet  -- 1 tab(s) by mouth every 4 hours, As needed, Severe Pain (7 - 10)  -- Indication: For Pain    tamsulosin 0.4 mg oral capsule  -- 1 cap(s) by mouth once a day (at bedtime)  -- Indication: For BPH    polyethylene glycol 3350 oral powder for reconstitution  -- 17 gram(s) by mouth once a day  -- Indication: For Constipation    levothyroxine 75 mcg (0.075 mg) oral tablet  -- 1 tab(s) by mouth once a day  -- Indication: For Hypothyroidism, unspecified type

## 2017-05-22 NOTE — SWALLOW BEDSIDE ASSESSMENT ADULT - SWALLOW EVAL: PATIENT/FAMILY GOALS STATEMENT
Patient/family deny h/o dysphagia, GERD, report h/o "walking PNA when 13 yrs old". Also deny current dysphagia or s/s of laryngeal penetration and/or aspiration with PO intake. Regarding voice, state "somewhat hoarse, somewhat huskier," report reduced vocal intensity. Patient reports significantly improved neck ROM s/p sx. Also states "today is the first day my throat doesn't feel irritated since the sx." Pt's family reports pt initially on clear liquid diet post-op, then upgraded to Soft then Regular texture diet.

## 2017-05-22 NOTE — DISCHARGE NOTE ADULT - PATIENT PORTAL LINK FT
“You can access the Follow[facility] Patient Portal, offered by [facility], by registering with the following website: [email or web address]”

## 2017-05-22 NOTE — SWALLOW BEDSIDE ASSESSMENT ADULT - COMMENTS
Hx contd: At no point did he lose consciousness and he has full recollection of the entire event. Pt openly admits that he is prone to falling and this has happened multiple times in the past. Pt states that he currently uses a walker, and his goal is to get back to using a cane. Dx: 1) Falls, 2) Fevers; Need to consider DVT, UTI, PNA as causes of post operative fever. Per Attending statement Pt most likely with pneumonia, suspect aspiration pneumonia given location and recent procedure w/intubation.  Will cover with zosyn for now, obtain PA/Lateral chest films. Hx contd: At no point did he lose consciousness and he has full recollection of the entire event. Pt openly admits that he is prone to falling and this has happened multiple times in the past. Pt states that he currently uses a walker, and his goal is to get back to using a cane. Dx: 1) Falls, 2) Fevers; Need to consider DVT, UTI, PNA as causes of post operative fever. Per Attending statement Pt most likely with pneumonia, suspect aspiration pneumonia given location and recent procedure w/intubation. Will cover with zosyn for now, obtain PA/Lateral chest films. HC: 5/18 CT Head: No acute intracranial hemorrhage, extra axial fluid collection or displaced skull fracture. If clinically indicated, a short-term follow-up or an MRI may be obtained for further evaluation.  CT C-Spine: Postsurgical and degenerative changes of the cervical spine. Diffuse osteopenia without obvious fracture or traumatic malalignment in the cervical spine. If clinically indicated, an MRI may be obtained for further evaluation.  Heterogenous thyroid gland, which can be further characterized on a nonemergent ultrasound. Seen by Nsx, pt s/p C5-T1 ACDF, POD #7 p/w generalized weakness and fever. Neurologiclally stable. Rx fever w/u, med eval, PT eval. 5/19 CXR: Right perihilar opacity likely represents prominent pulmonary vasculature. Otherwise, clear lungs. Per Med pt febrile, Tmax 101.6 on presentation. POD # 8 unlikely atelectasis, unlikely wound infx on exam, CXR clear no PNA, CT head and neck no acute findings. WELLS score 9.0 making PE concern very high. Rx CTA chest to r/o PE and BLE dopplers. US Duplex LE: No evidence of deep vein thrombosis in the visualized venous segments of either lower extremity. Per Med will need CTA despite (-) dopplers. Speech an dswallow eval given recent cervical fusion.     *contd below

## 2017-05-22 NOTE — SWALLOW BEDSIDE ASSESSMENT ADULT - SWALLOW EVAL: RECOMMENDED FEEDING/EATING TECHNIQUES
alternate food with liquid/allow for swallow between intakes/position upright (90 degrees)/small sips/bites/maintain upright posture during/after eating for 30 mins/crush medication (when feasible)/no straws

## 2017-05-22 NOTE — DISCHARGE NOTE ADULT - ADDITIONAL INSTRUCTIONS
Please follow up with your PMD within 1 week of discharge  You can follow up with GI clinic if you have symptoms [phone]  Continue followup with your neurosurgeon and hematologist.

## 2017-05-22 NOTE — DISCHARGE NOTE ADULT - MEDICATION SUMMARY - MEDICATIONS TO STOP TAKING
I will STOP taking the medications listed below when I get home from the hospital:    Procrit 40,000 units/mL preservative-free injectable solution  --  injectable every 2 weeks    docusate sodium 100 mg oral capsule  -- 1 cap(s) by mouth 3 times a day    senna oral tablet  -- 2 tab(s) by mouth once a day (at bedtime)

## 2017-05-22 NOTE — DISCHARGE NOTE ADULT - CARE PROVIDER_API CALL
Kishor Sharma (MD), Neurological Surgery  [address] [place] [id number]  Phone: [phone]  Fax: [phone]    Mario Valdivia (), Internal Medicine  [facility] [place] [address]  Phone: [phone]  Fax: [phone] Kishor Sharma (MD), Neurological Surgery  [address] [place] [address]  Phone: [phone]  Fax: [phone]    Mario Valdivia (), Internal Medicine  [facility] [place] [address]  Phone: [phone]  Fax: [phone]    Jose Sawyer), Medicine  [address] [place] [address]  Phone: [phone]  Fax: [phone]

## 2017-05-22 NOTE — SWALLOW BEDSIDE ASSESSMENT ADULT - SWALLOW EVAL: PROGNOSIS
Hx contd: CTA Chest: No pulmonary embolus. Trace bilateral pleural effusions. 5/20 Per Med CTA chest (-) for PE, small concern for b/l PNA, especially w/ high WBC count; will cover w/ moxifloxacin. 5/22 US Thyroid/Parathyroid: 1.1 cm partially cystic/partially solid left lobe nodule. Recommend follow-up thyroid sonography to assess for interval change in size in 12 months. Per Med possible aspiration PNA 2/2 recent neck sx.

## 2017-05-22 NOTE — SWALLOW BEDSIDE ASSESSMENT ADULT - SLP PERTINENT HISTORY OF CURRENT PROBLEM
82M HTN, hypothyroid, hemachromatosis (with procrit injections), neuropathy, BPH, cervical disc disease (s/p anterior cervical diskectomy with fusion on recent admission on 5/15/2017), multiple falls, presents with weakness, fevers, chills, pre-syncopal fall with ensuing head trauma. Patient states that after he was discharged from the hospital he has not been feeling himself. He states that he has pain in his neck which restricts his ROM and has paraspinal muscle tenderness below the level of the neck. Pt further endorses chronic leg pain, L > R and states that he has developed a "numb" foot on the left side. Of note, the pt has noticed that his left leg has been swelling since his surgery, and this is new for him. Pt denies any sick contacts, travel, fevers, chills, N/V/D/C, cough, nasal congestion, dysuria, or urinary frequency. Pt was in the shower this morning, and when he was walking out of the bathroom, he fell and hit his head.

## 2017-05-22 NOTE — DISCHARGE NOTE ADULT - CARE PROVIDERS DIRECT ADDRESSES
,[email or web address]_[email or web address],DirectAddress_Unknown,DirectAddress_Unknown

## 2017-05-22 NOTE — DISCHARGE NOTE ADULT - PLAN OF CARE
Diet/strategies: Dysphagia I with nectar thickened liquids. MD/team Please enter the following as notes to RN: 1) Full assist with meals, 2) Crush meds or provide via alternate source, 3) Chin tuck (neck flexion) with p.o. 4) Provide small single bites and sips at slow rate 5) Encourage 1 successive swallows for oral and pharyngeal clearance 6) Alternate food and liquids to aid in clearance 7) Aspiration/reflux precautions. Monitor for s/s aspiration/laryngeal penetration. If noted:  D/C p.o. intake, provide non-oral nutrition/hydration/meds There is some concern that you may be aspirating your food and our speech pathologists recommended the following strategies to try and minimize aspiration:  Diet/strategies: Dysphagia I with nectar thickened liquids. MD/team Please enter the following as notes to RN: 1) Full assist with meals, 2) Crush meds or provide via alternate source, 3) Chin tuck (neck flexion) with p.o. 4) Provide small single bites and sips at slow rate 5) Encourage 1 successive swallows for oral and pharyngeal clearance 6) Alternate food and liquids to aid in clearance 7) Aspiration/reflux precautions. Monitor for s/s aspiration/laryngeal penetration. If noted:  D/C p.o. intake, provide non-oral nutrition/hydration/meds    Hopefully, this is from post surgical change and will resolve as you heal, please make sure to follow up with your neurosurgery team. Follow up with your PCP Mario Valdivia Follow up with Gastroenterology Please make sure to follow up with your neurosurgery team. Please follow up with Dr. Sharma You had pneumonia that was treated with antibiotics.  Your clinical picture improved.  Our physical therapists evaluated you recommended subacute rehab.  Please follow up with your PCP Mario Last within a week. Continue your Synthroid as prescribed.  An US was performed of your thyroid and a 1.1 cm partially cystic/partially solid left lobe nodule was noted.  We recommend follow-up thyroid sonography to assess for interval change in size in 12 months. Follow up with your PCP Please follow up with your hematologist for further procrit injections Please make sure to follow up with your neurosurgery team.  The steri strips on your previous incision will fall off on their own. If they cause discomfort, you may peel them off.

## 2017-05-22 NOTE — DISCHARGE NOTE ADULT - SECONDARY DIAGNOSIS.
Dysphagia, unspecified type Cervical disc disease Hypothyroidism, unspecified type Hemochromatosis, unspecified hemochromatosis type

## 2017-05-22 NOTE — SWALLOW BEDSIDE ASSESSMENT ADULT - SLP GENERAL OBSERVATIONS
Pt encountered supine in bed, positioned upright for purpose of evaluation. Pt A&Ox3, able to make wants and needs known and follow directives for purpose of evaluation. Speech and language WNL in conversation. Reduced vocal volume/intensity noted. +[place].

## 2017-05-22 NOTE — DISCHARGE NOTE ADULT - HOSPITAL COURSE
82yoM HTN, hypothyroid, hemachromatosis (with procrit injections), neuropathy, BPH, cervical disc disease (s/p anterior cervical discectomy with fusion on 5/11/2017), hx of multiple falls, presented with weakness, fevers, chills, pre-syncopal fall in the context unilateral lower extremity swelling.  Pt had negative BLE dopplers and a negative CTA.  Pt had leukocytosis after day one and then concern was for possible PNA and was treated with Moxifloxacin.  Concern was for possible aspiration PNA in the context of recent neck surgery, speech and swallow recommended MBS which indicated some possible orophrayngeal dysphagia, recommended GI evaluation but GI recommended evaluation as an outpatient.  During his stay he had diarrhea on and off, which was C.diff toxin negative.  He was continued on a Dysphagia 1 diet which he tolerated well.  Pt was seen by PT and recommended for [facility].  Pt was stable for d/c to [facility] on ?!?!?!?.  Pt was advised to follow up with his PCP and Neurosurgeon. 82yoM HTN, hypothyroid, hemachromatosis (with procrit injections), neuropathy, BPH, cervical disc disease (s/p anterior cervical discectomy with fusion on 5/11/2017), hx of multiple falls, presented with weakness, fevers, chills, pre-syncopal fall in the context unilateral lower extremity swelling.  Pt had negative BLE dopplers and a negative CTA.  Pt had leukocytosis after day one and then concern was for possible PNA and was treated with Moxifloxacin.  Concern was for possible aspiration PNA in the context of recent neck surgery, speech and swallow recommended MBS which indicated some possible orophrayngeal dysphagia, recommended GI evaluation but GI recommended evaluation as an outpatient.  During his stay he had diarrhea on and off, which was C.diff toxin negative.  He was continued on a Dysphagia 1 diet which he tolerated well.  Pt was seen by PT and recommended for [facility]. Pt was advised to follow up with his PCP and Neurosurgeon.    Pt was d/c on 5/25 to rehab.   Signout given to PMD Dr. Mario Valdivia who will follow with pt after discharge. 82yoM HTN, hypothyroid, hemachromatosis (with procrit injections), neuropathy, BPH, cervical disc disease (s/p anterior cervical discectomy with fusion on 5/11/2017), hx of multiple falls, presented with weakness, fevers, chills, pre-syncopal fall in the context unilateral lower extremity swelling.  Pt had negative BLE dopplers and a negative CTA.  Pt had leukocytosis after day one and then concern was for possible PNA and was treated with Moxifloxacin.  Concern was for possible aspiration PNA in the context of recent neck surgery, speech and swallow recommended MBS which indicated some possible orophrayngeal dysphagia, recommended GI evaluation but GI recommended evaluation as an outpatient.  During his stay he had diarrhea on and off, which was C.diff toxin negative.  He was continued on a Dysphagia 1 diet which he tolerated well.  Pt was seen by PT and recommended for [facility]. Pt was advised to follow up with his PCP and Neurosurgeon.    Pt was d/c on 5/25 to rehab.   Signout given to PMD Dr. Mario Valdivia who will follow with pt after discharge from hospital .

## 2017-05-22 NOTE — SWALLOW BEDSIDE ASSESSMENT ADULT - ASR SWALLOW ASPIRATION MONITOR
change of breathing pattern/fever/pneumonia/cough/gurgly voice/throat clearing/Monitor for s/s aspiration/laryngeal penetration. If noted:  D/C p.o. intake, provide non-oral nutrition/hydration/meds, and contact this service @ [phone]/upper respiratory infection

## 2017-05-22 NOTE — DISCHARGE NOTE ADULT - CARE PLAN
Instructions for follow-up, activity and diet:	Diet/strategies: Dysphagia I with nectar thickened liquids. MD/team Please enter the following as notes to RN: 1) Full assist with meals, 2) Crush meds or provide via alternate source, 3) Chin tuck (neck flexion) with p.o. 4) Provide small single bites and sips at slow rate 5) Encourage 1 successive swallows for oral and pharyngeal clearance 6) Alternate food and liquids to aid in clearance 7) Aspiration/reflux precautions. Monitor for s/s aspiration/laryngeal penetration. If noted:  D/C p.o. intake, provide non-oral nutrition/hydration/meds Principal Discharge DX:	Pneumonia of both lower lobes due to infectious organism  Goal:	Follow up with your PCP Mario Valdivia  Instructions for follow-up, activity and diet:	You had pneumonia that was treated with antibiotics.  Your clinical picture improved.  Our physical therapists evaluated you recommended subacute rehab.  Please follow up with your PCP Mario Valdivia within a week.  Secondary Diagnosis:	Dysphagia, unspecified type  Goal:	Follow up with Gastroenterology  Instructions for follow-up, activity and diet:	There is some concern that you may be aspirating your food and our speech pathologists recommended the following strategies to try and minimize aspiration:  Diet/strategies: Dysphagia I with nectar thickened liquids. MD/team Please enter the following as notes to RN: 1) Full assist with meals, 2) Crush meds or provide via alternate source, 3) Chin tuck (neck flexion) with p.o. 4) Provide small single bites and sips at slow rate 5) Encourage 1 successive swallows for oral and pharyngeal clearance 6) Alternate food and liquids to aid in clearance 7) Aspiration/reflux precautions. Monitor for s/s aspiration/laryngeal penetration. If noted:  D/C p.o. intake, provide non-oral nutrition/hydration/meds    Hopefully, this is from post surgical change and will resolve as you heal, please make sure to follow up with your neurosurgery team.  Secondary Diagnosis:	Cervical disc disease  Goal:	Please follow up with Dr. Sharma  Instructions for follow-up, activity and diet:	Please make sure to follow up with your neurosurgery team. Principal Discharge DX:	Pneumonia of both lower lobes due to infectious organism  Goal:	Follow up with your PCP Mario Valdivia  Instructions for follow-up, activity and diet:	You had pneumonia that was treated with antibiotics.  Your clinical picture improved.  Our physical therapists evaluated you recommended subacute rehab.  Please follow up with your PCP Mario Valdivia within a week.  Secondary Diagnosis:	Dysphagia, unspecified type  Goal:	Follow up with Gastroenterology  Instructions for follow-up, activity and diet:	There is some concern that you may be aspirating your food and our speech pathologists recommended the following strategies to try and minimize aspiration:  Diet/strategies: Dysphagia I with nectar thickened liquids. MD/team Please enter the following as notes to RN: 1) Full assist with meals, 2) Crush meds or provide via alternate source, 3) Chin tuck (neck flexion) with p.o. 4) Provide small single bites and sips at slow rate 5) Encourage 1 successive swallows for oral and pharyngeal clearance 6) Alternate food and liquids to aid in clearance 7) Aspiration/reflux precautions. Monitor for s/s aspiration/laryngeal penetration. If noted:  D/C p.o. intake, provide non-oral nutrition/hydration/meds    Hopefully, this is from post surgical change and will resolve as you heal, please make sure to follow up with your neurosurgery team.  Secondary Diagnosis:	Cervical disc disease  Goal:	Please follow up with Dr. Sharma  Instructions for follow-up, activity and diet:	Please make sure to follow up with your neurosurgery team.  Secondary Diagnosis:	Hypothyroidism, unspecified type  Goal:	Follow up with your PCP  Instructions for follow-up, activity and diet:	Continue your Synthroid as prescribed.  An US was performed of your thyroid and a 1.1 cm partially cystic/partially solid left lobe nodule was noted.  We recommend follow-up thyroid sonography to assess for interval change in size in 12 months. Principal Discharge DX:	Pneumonia of both lower lobes due to infectious organism  Goal:	Follow up with your PCP Mario Valdivia  Instructions for follow-up, activity and diet:	You had pneumonia that was treated with antibiotics.  Your clinical picture improved.  Our physical therapists evaluated you recommended subacute rehab.  Please follow up with your PCP Mario Valdivia within a week.  Secondary Diagnosis:	Dysphagia, unspecified type  Goal:	Follow up with Gastroenterology  Instructions for follow-up, activity and diet:	There is some concern that you may be aspirating your food and our speech pathologists recommended the following strategies to try and minimize aspiration:  Diet/strategies: Dysphagia I with nectar thickened liquids. MD/team Please enter the following as notes to RN: 1) Full assist with meals, 2) Crush meds or provide via alternate source, 3) Chin tuck (neck flexion) with p.o. 4) Provide small single bites and sips at slow rate 5) Encourage 1 successive swallows for oral and pharyngeal clearance 6) Alternate food and liquids to aid in clearance 7) Aspiration/reflux precautions. Monitor for s/s aspiration/laryngeal penetration. If noted:  D/C p.o. intake, provide non-oral nutrition/hydration/meds    Hopefully, this is from post surgical change and will resolve as you heal, please make sure to follow up with your neurosurgery team.  Secondary Diagnosis:	Cervical disc disease  Goal:	Please follow up with Dr. Sharma  Instructions for follow-up, activity and diet:	Please make sure to follow up with your neurosurgery team.  The steri strips on your previous incision will fall off on their own. If they cause discomfort, you may peel them off.  Secondary Diagnosis:	Hypothyroidism, unspecified type  Goal:	Follow up with your PCP  Instructions for follow-up, activity and diet:	Continue your Synthroid as prescribed.  An US was performed of your thyroid and a 1.1 cm partially cystic/partially solid left lobe nodule was noted.  We recommend follow-up thyroid sonography to assess for interval change in size in 12 months.  Secondary Diagnosis:	Hemochromatosis, unspecified hemochromatosis type  Instructions for follow-up, activity and diet:	Please follow up with your hematologist for further procrit injections

## 2017-05-22 NOTE — SWALLOW BEDSIDE ASSESSMENT ADULT - NS ASR SWALLOW FINDINGS DISCUS
Family/Patient/Physician/Nursing/MD Verduzco, SANYA Martin, pt's spouse and daughter at bedside Physician/Nursing/Patient/Family/MD Verduzco ([phone]), RN Veronica, pt's spouse and daughter at bedside

## 2017-05-22 NOTE — SWALLOW BEDSIDE ASSESSMENT ADULT - PHARYNGEAL PHASE
intermittent repeat swallow/Decreased laryngeal elevation Decreased laryngeal elevation/significant coughing post intake via straw Complaints of pharyngeal stasis/Multiple swallows/Decreased laryngeal elevation/pt pointing to laryngeal prominence, states "I still feel it here"

## 2017-05-22 NOTE — DISCHARGE NOTE ADULT - PROVIDER TOKENS
TOKEN:'[id number]',TOKEN:'[id number]' TOKEN:'[id number]:'[id number]',TOKEN:'[id number]'

## 2017-05-23 LAB
ANION GAP SERPL CALC-SCNC: 15 MMOL/L — SIGNIFICANT CHANGE UP (ref 5–17)
BUN SERPL-MCNC: 18 MG/DL — SIGNIFICANT CHANGE UP (ref 7–23)
CALCIUM SERPL-MCNC: 8.2 MG/DL — LOW (ref 8.4–10.5)
CHLORIDE SERPL-SCNC: 104 MMOL/L — SIGNIFICANT CHANGE UP (ref 96–108)
CO2 SERPL-SCNC: 21 MMOL/L — LOW (ref 22–31)
CREAT SERPL-MCNC: 1.21 MG/DL — SIGNIFICANT CHANGE UP (ref 0.5–1.3)
GLUCOSE SERPL-MCNC: 100 MG/DL — HIGH (ref 70–99)
HCT VFR BLD CALC: 27.7 % — LOW (ref 39–50)
HGB BLD-MCNC: 8.8 G/DL — LOW (ref 13–17)
MAGNESIUM SERPL-MCNC: 1.8 MG/DL — SIGNIFICANT CHANGE UP (ref 1.6–2.6)
MCHC RBC-ENTMCNC: 28.8 PG — SIGNIFICANT CHANGE UP (ref 27–34)
MCHC RBC-ENTMCNC: 31.8 GM/DL — LOW (ref 32–36)
MCV RBC AUTO: 90.5 FL — SIGNIFICANT CHANGE UP (ref 80–100)
PLATELET # BLD AUTO: 234 K/UL — SIGNIFICANT CHANGE UP (ref 150–400)
POTASSIUM SERPL-MCNC: 3.8 MMOL/L — SIGNIFICANT CHANGE UP (ref 3.5–5.3)
POTASSIUM SERPL-SCNC: 3.8 MMOL/L — SIGNIFICANT CHANGE UP (ref 3.5–5.3)
RBC # BLD: 3.06 M/UL — LOW (ref 4.2–5.8)
RBC # FLD: 18.6 % — HIGH (ref 10.3–14.5)
SODIUM SERPL-SCNC: 140 MMOL/L — SIGNIFICANT CHANGE UP (ref 135–145)
WBC # BLD: 7.09 K/UL — SIGNIFICANT CHANGE UP (ref 3.8–10.5)
WBC # FLD AUTO: 7.09 K/UL — SIGNIFICANT CHANGE UP (ref 3.8–10.5)

## 2017-05-23 PROCEDURE — 74230 X-RAY XM SWLNG FUNCJ C+: CPT | Mod: 26

## 2017-05-23 PROCEDURE — 99233 SBSQ HOSP IP/OBS HIGH 50: CPT | Mod: GC

## 2017-05-23 RX ADMIN — MOXIFLOXACIN HYDROCHLORIDE TABLETS, 400 MG 400 MILLIGRAM(S): 400 TABLET, FILM COATED ORAL at 12:21

## 2017-05-23 RX ADMIN — Medication 75 MICROGRAM(S): at 06:53

## 2017-05-23 RX ADMIN — TAMSULOSIN HYDROCHLORIDE 0.4 MILLIGRAM(S): 0.4 CAPSULE ORAL at 21:36

## 2017-05-23 RX ADMIN — ENOXAPARIN SODIUM 40 MILLIGRAM(S): 100 INJECTION SUBCUTANEOUS at 12:21

## 2017-05-23 RX ADMIN — Medication 100 MILLIGRAM(S): at 06:52

## 2017-05-23 NOTE — SWALLOW VFSS/MBS ASSESSMENT ADULT - THE ABOVE FINDINGS WERE DISCUSSED WITH
d/w medicine team # 6 Dr. Hernandez [phone] d/w medicine team # 6 Dr. Hernandez [phone], reviewed with SANYA Arnett and discussed at length with pt and wife at bedside

## 2017-05-23 NOTE — SWALLOW VFSS/MBS ASSESSMENT ADULT - NS SWALLOW VFSS REC ASPIR MON
pneumonia/cough/gurgly voice/Monitor for s/s aspiration/laryngeal penetration. If noted:  D/C p.o. intake, provide non-oral nutrition/hydration/meds, and contact this service @ [phone]/upper respiratory infection/change of breathing pattern/fever/throat clearing

## 2017-05-23 NOTE — SWALLOW VFSS/MBS ASSESSMENT ADULT - LARYNGEAL PENETRATION AFTER THE SWALLOW - SILENT
spillover of vallecular residue was noted along the a-e folds and appeared to be along the laryngeal surface

## 2017-05-23 NOTE — SWALLOW VFSS/MBS ASSESSMENT ADULT - DIAGNOSTIC IMPRESSIONS
Patient presents with xsm-xyosbadw-upmwlwdfsu dysphagia with delay in trigger of the swallow reflex, post swallow pharyngeal residue (for all textures, but most pronounced for solids, which was difficult to clear), and impaired airway protectin with silent laryngeal penetration of nectar thick and thin liquids. Cervical and thoracic esophageal residue was also identified. Again, this was most pronounced for solids and semisolids.  There was retention at and below the level of the surgical hardware, with inconsistent retrograde flow to the hypopharynx as well as intraesophageal reflux. Due to the physical constraints of testing, unable to fully assess this stage of the swallow or to determine the etiology of deficits in this region. Although post surgical edema may be playing a role, there appears to be issues below the level of the hardware. Suggest GI consult for comprehensive exam.     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 Patient presents with ong-dyarhafh-snrxvkwytb dysphagia with delay in trigger of the swallow reflex, post swallow pharyngeal residue (for all textures, but most pronounced for solids, which was difficult to clear), and impaired airway protection with silent laryngeal penetration of nectar thick and thin liquids (this was cleared with the former consistency). Cervical and thoracic esophageal residue was also identified. Again, this was most pronounced for solids and semisolids.  There was retention at and below the level of the surgical hardware, with inconsistent retrograde flow to the hypopharynx as well as intraesophageal reflux. Due to the physical constraints of testing, unable to fully assess this stage of the swallow or to determine the etiology of deficits in this region. Although post surgical edema may be playing a role, there appears to be issues below the level of the hardware. Suggest GI consult for comprehensive exam.     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

## 2017-05-23 NOTE — SWALLOW VFSS/MBS ASSESSMENT ADULT - ORAL PHASE COMMENTS
Oral transit time noted to be 3.4  seconds. Maximal spillover to the level of the valleculae Oral transit time noted to be 3.7  seconds. Maximal spillover to the level of the valleculae Maximal spillover to the level of the valleculae with inconsistent moderate passive overflow along the a-e folds to the pyriform sinuses. There appeared to be trace oral residue with spillover to the valleculae Piecemeal deglutition with need for successive swallow to reduce oral residue A majority of oral preparation and transport took place off fluoro to limit exposure; however, when fluoro on, it took > 17 sec to transfer. Moderate spillover to the level of the valleculae

## 2017-05-23 NOTE — SWALLOW VFSS/MBS ASSESSMENT ADULT - ESOPHAGEAL STAGE
Noted distention of the cervical esophagus prior to arrival of bolus head. There was an anterior filling defect in the proximal esophagus. This was suggestive of a web, but suggest GI consult for complete assessment. There was retention in the distal portion of the cervical esophagus. This was noted when the esophagus elevated during the swallow.  There was an anterior filling defect in the proximal esophagus. This was suggestive of a web, but suggest GI consult for complete assessment. There was an anterior filling defect in the proximal esophagus. This was suggestive of a web, but suggest GI consult for complete assessment. There was at least moderate to severe retention in the distal portion of the cervical esophagus, with retrograde flow to the pyriform sinuses. This was noted when the esophagus elevated during the swallow.  There was an anterior filling defect in the proximal esophagus. This was suggestive of a web, but suggest GI consult for complete assessment. When viewed in the lateral plane: There was retention in the distal portion of the cervical esophagus. This was noted when the esophagus elevated during the swallow.  There was an anterior filling defect in the proximal esophagus. This was suggestive of a web.     When viewed in the AP Plane: the full esophagus could not be visualized due to the physical constraints of testing. The observed potion of the lumen appeared to be dilated. During the swallow, as the thick puree bolus was being propelled, material from a prior swallow(s) was noted to be refluxed. ( This appeared to be a fluid bolus based on the density.)  At this point, there was retention of both the puree and suspected liquid bolus in the thoracic esophagus. There was slow transit of both textures. There was intraesophageal reflux of both the fluid and pureed material. The above was noted for approximately 19 seconds of fluoro time and took 3 swallows to reduce residue to a minimal amount. Suggest GI consult for complete assessment. Noted: edema of the proximal posterior esophageal wall which narrowed the lumen, + anterior filling defect Noted: edema of the proximal posterior esophageal wall which narrowed the lumen and anterior filling defect Noted: edema of the proximal posterior esophageal wall which narrowed the lumen, distention of the cervical esophagus prior to arrival of bolus head. There was an anterior filling defect in the proximal esophagus. This was suggestive of a web, but suggest GI consult for complete assessment. Noted: edema of the proximal posterior esophageal wall which narrowed the lumen. There was an anterior filling defect in the proximal esophagus. This was suggestive of a web, but suggest GI consult for complete assessment. There was at least moderate to severe retention in the distal portion of the cervical esophagus, with marked retrograde flow to the pyriform sinuses. This was noted when the esophagus elevated during the swallow.  There was an anterior filling defect in the proximal esophagus. This was suggestive of a web, but suggest GI consult for complete assessment. When viewed in the lateral plane: edema of the proximal posterior esophageal wall which narrowed the lumen. There was retention in the distal portion of the cervical esophagus. This was noted when the esophagus elevated during the swallow.  There was an anterior filling defect in the proximal esophagus. This was suggestive of a web.     When viewed in the AP Plane: the full esophagus could not be visualized due to the physical constraints of testing. The observed potion of the lumen appeared to be dilated. During the swallow, as the thick puree bolus was being propelled, material from a prior swallow(s) was noted to be refluxed. ( This appeared to be a fluid bolus based on the density.)  At this point, there was retention of both the puree and suspected liquid bolus in the thoracic esophagus. There was slow transit of both textures. There was intraesophageal reflux of both the fluid and pureed material. The above was noted for approximately 19 seconds of fluoro time and took 3 swallows to reduce residue to a minimal amount. Suggest GI consult for complete assessment.

## 2017-05-23 NOTE — SWALLOW VFSS/MBS ASSESSMENT ADULT - LARYNGEAL PENETRATION DURING THE SWALLOW - SILENT
over the laryngeal surface of the arytenoids/Mild Mild/over the laryngeal surface of the arytenoids over the laryngeal surface of the arytenoids- clears during the swallow/Mild

## 2017-05-23 NOTE — SWALLOW VFSS/MBS ASSESSMENT ADULT - RECOMMENDED CONSISTENCY
From an aylin-pharyngeal standpoint, suggest the following diet/strategies; however, would suggest GI consult to determine if any additional modifications are needed. Diet/strategies: Dysphagia I with nectar thickened liquids. MD/team Please enter the following as notes to RN: 1) Full assist with meals, 2) Crush meds or provide via alternate source, 3) Chin tuck (neck flexion) with p.o. 4) Provide small single bites and sips at slow rate 5) Encourage 1 successive swallows for oral and pharyngeal clearance 6) Alternate food and liquids to aid in clearance 7) Aspiration/reflux precautions. Monitor for s/s aspiration/laryngeal penetration. If noted:  D/C p.o. intake, provide non-oral nutrition/hydration/meds

## 2017-05-23 NOTE — SWALLOW VFSS/MBS ASSESSMENT ADULT - ADDITIONAL INFORMATION
Calcification of thyroid cartilage, calcification lateral neck, + cervical hardware with edema noted above and at the surgical site. Calcification of thyroid cartilage, calcification lateral neck, + cervical hardware with edema noted above and at the surgical site.    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  Etiology of other esophageal  is unclear - suggest GI consult for assessment.     Strategies: dietary modification (to Dysphagia I with nectar thickened liquids), intake of small single bites and sips at slow rate, at least 2 successive swallows and texture alternation aided in a/w protection and reduction of pharyngeal and esophageal residue (in the observed portion of the lumen) Calcification of thyroid cartilage, calcification lateral neck, + cervical hardware with edema noted above and at the surgical site.    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 Etiology of other esophageal findings is unclear - suggest GI consult for comprehensive assessment.     Strategies: dietary modification (to Dysphagia I with nectar thickened liquids), intake of small single bites and sips at slow rate, at least 2 successive swallows and texture alternation aided in a/w protection and reduction of pharyngeal and esophageal residue (in the observed portion of the lumen)

## 2017-05-23 NOTE — SWALLOW VFSS/MBS ASSESSMENT ADULT - RESIDUE IN VALLECULAE
Mild/Moderate Severe/There was spillover of vallecular residue along the a-e folds Moderate/Severe Severe Moderate/spillover of vallecular residue was noted along the a-e folds and appeared to be along the laryngeal surface

## 2017-05-24 LAB
CULTURE RESULTS: SIGNIFICANT CHANGE UP
CULTURE RESULTS: SIGNIFICANT CHANGE UP
SPECIMEN SOURCE: SIGNIFICANT CHANGE UP
SPECIMEN SOURCE: SIGNIFICANT CHANGE UP

## 2017-05-24 PROCEDURE — 99222 1ST HOSP IP/OBS MODERATE 55: CPT

## 2017-05-24 PROCEDURE — 99233 SBSQ HOSP IP/OBS HIGH 50: CPT | Mod: GC

## 2017-05-24 RX ADMIN — ENOXAPARIN SODIUM 40 MILLIGRAM(S): 100 INJECTION SUBCUTANEOUS at 12:58

## 2017-05-24 RX ADMIN — Medication 75 MICROGRAM(S): at 05:17

## 2017-05-24 RX ADMIN — MOXIFLOXACIN HYDROCHLORIDE TABLETS, 400 MG 400 MILLIGRAM(S): 400 TABLET, FILM COATED ORAL at 12:58

## 2017-05-24 RX ADMIN — TAMSULOSIN HYDROCHLORIDE 0.4 MILLIGRAM(S): 0.4 CAPSULE ORAL at 21:18

## 2017-05-24 NOTE — DIETITIAN INITIAL EVALUATION ADULT. - OTHER INFO
Pt seen for LOS, reports UBW between 190 pounds to 200 pounds, has been stable. Per previous H&P, pt wt was 186 pounds last month, current dosing wt is 190 pounds. Pt with fair to good appetite, % po intakes noted per flowsheet. Pt wears partial upper dentures with good fit, denies chewing difficulty. Per chart, pt with recent diskectomy (5/15/2017) developed swallowing difficulty, s/p [facility] recommends Dysphagia 1 Sparkman consistency diet (5/23). Per chart, pt with diarrhea, which pt denies and states his BM is soft 2/2 stool softeners. NKFA. PTA takes vitamin C, D, and multivitamin.

## 2017-05-24 NOTE — DIETITIAN INITIAL EVALUATION ADULT. - ENERGY NEEDS
Height:72 inches, Weight:190 pounds  BMI: 25.8kg/m2 IBW:178 pounds (+/-10%), %IBW:107%  Pertinent Info: Per chart, 83 y/o male with cervical disc disease, s/p diskectomy (5/15/2017) admitted s/p fall at home and fever. No edema, no pressure ulcers noted at this time.

## 2017-05-24 NOTE — DIETITIAN INITIAL EVALUATION ADULT. - ORAL INTAKE PTA
Pt states he has always been conscious of his wt, has been following [employer] 'for years since 1986' reports enjoying 'simple meals' at home, usually prepared by wife and daughter at home. Usual intakes include- pasta, grilled cheese, tomato sandwich, soups and rice and beans./fair

## 2017-05-25 VITALS
DIASTOLIC BLOOD PRESSURE: 65 MMHG | HEART RATE: 85 BPM | OXYGEN SATURATION: 97 % | TEMPERATURE: 98 F | RESPIRATION RATE: 18 BRPM | SYSTOLIC BLOOD PRESSURE: 115 MMHG

## 2017-05-25 LAB
ANION GAP SERPL CALC-SCNC: 12 MMOL/L — SIGNIFICANT CHANGE UP (ref 5–17)
BASOPHILS # BLD AUTO: 0.08 K/UL — SIGNIFICANT CHANGE UP (ref 0–0.2)
BASOPHILS NFR BLD AUTO: 0.9 % — SIGNIFICANT CHANGE UP (ref 0–2)
BUN SERPL-MCNC: 18 MG/DL — SIGNIFICANT CHANGE UP (ref 7–23)
CALCIUM SERPL-MCNC: 8.6 MG/DL — SIGNIFICANT CHANGE UP (ref 8.4–10.5)
CHLORIDE SERPL-SCNC: 103 MMOL/L — SIGNIFICANT CHANGE UP (ref 96–108)
CO2 SERPL-SCNC: 24 MMOL/L — SIGNIFICANT CHANGE UP (ref 22–31)
CREAT SERPL-MCNC: 1.34 MG/DL — HIGH (ref 0.5–1.3)
EOSINOPHIL # BLD AUTO: 0.68 K/UL — HIGH (ref 0–0.5)
EOSINOPHIL NFR BLD AUTO: 8 % — HIGH (ref 0–6)
GLUCOSE SERPL-MCNC: 94 MG/DL — SIGNIFICANT CHANGE UP (ref 70–99)
HCT VFR BLD CALC: 28.4 % — LOW (ref 39–50)
HGB BLD-MCNC: 9.1 G/DL — LOW (ref 13–17)
LYMPHOCYTES # BLD AUTO: 1.82 K/UL — SIGNIFICANT CHANGE UP (ref 1–3.3)
LYMPHOCYTES # BLD AUTO: 21.4 % — SIGNIFICANT CHANGE UP (ref 13–44)
MCHC RBC-ENTMCNC: 28.9 PG — SIGNIFICANT CHANGE UP (ref 27–34)
MCHC RBC-ENTMCNC: 32 GM/DL — SIGNIFICANT CHANGE UP (ref 32–36)
MCV RBC AUTO: 90.2 FL — SIGNIFICANT CHANGE UP (ref 80–100)
MONOCYTES # BLD AUTO: 0.46 K/UL — SIGNIFICANT CHANGE UP (ref 0–0.9)
MONOCYTES NFR BLD AUTO: 5.4 % — SIGNIFICANT CHANGE UP (ref 2–14)
NEUTROPHILS # BLD AUTO: 5.32 K/UL — SIGNIFICANT CHANGE UP (ref 1.8–7.4)
NEUTROPHILS NFR BLD AUTO: 61.6 % — SIGNIFICANT CHANGE UP (ref 43–77)
PLATELET # BLD AUTO: 305 K/UL — SIGNIFICANT CHANGE UP (ref 150–400)
POTASSIUM SERPL-MCNC: 4 MMOL/L — SIGNIFICANT CHANGE UP (ref 3.5–5.3)
POTASSIUM SERPL-SCNC: 4 MMOL/L — SIGNIFICANT CHANGE UP (ref 3.5–5.3)
RBC # BLD: 3.15 M/UL — LOW (ref 4.2–5.8)
RBC # FLD: 18.3 % — HIGH (ref 10.3–14.5)
SODIUM SERPL-SCNC: 139 MMOL/L — SIGNIFICANT CHANGE UP (ref 135–145)
WBC # BLD: 8.51 K/UL — SIGNIFICANT CHANGE UP (ref 3.8–10.5)
WBC # FLD AUTO: 8.51 K/UL — SIGNIFICANT CHANGE UP (ref 3.8–10.5)

## 2017-05-25 PROCEDURE — 80053 COMPREHEN METABOLIC PANEL: CPT

## 2017-05-25 PROCEDURE — 84480 ASSAY TRIIODOTHYRONINE (T3): CPT

## 2017-05-25 PROCEDURE — 84100 ASSAY OF PHOSPHORUS: CPT

## 2017-05-25 PROCEDURE — 80048 BASIC METABOLIC PNL TOTAL CA: CPT

## 2017-05-25 PROCEDURE — 85014 HEMATOCRIT: CPT

## 2017-05-25 PROCEDURE — 83735 ASSAY OF MAGNESIUM: CPT

## 2017-05-25 PROCEDURE — 99239 HOSP IP/OBS DSCHRG MGMT >30: CPT

## 2017-05-25 PROCEDURE — 87798 DETECT AGENT NOS DNA AMP: CPT

## 2017-05-25 PROCEDURE — 92610 EVALUATE SWALLOWING FUNCTION: CPT

## 2017-05-25 PROCEDURE — 83036 HEMOGLOBIN GLYCOSYLATED A1C: CPT

## 2017-05-25 PROCEDURE — 82803 BLOOD GASES ANY COMBINATION: CPT

## 2017-05-25 PROCEDURE — 87324 CLOSTRIDIUM AG IA: CPT

## 2017-05-25 PROCEDURE — 97163 PT EVAL HIGH COMPLEX 45 MIN: CPT

## 2017-05-25 PROCEDURE — 85610 PROTHROMBIN TIME: CPT

## 2017-05-25 PROCEDURE — 83605 ASSAY OF LACTIC ACID: CPT

## 2017-05-25 PROCEDURE — 84436 ASSAY OF TOTAL THYROXINE: CPT

## 2017-05-25 PROCEDURE — 93005 ELECTROCARDIOGRAM TRACING: CPT

## 2017-05-25 PROCEDURE — 84295 ASSAY OF SERUM SODIUM: CPT

## 2017-05-25 PROCEDURE — 84132 ASSAY OF SERUM POTASSIUM: CPT

## 2017-05-25 PROCEDURE — 87581 M.PNEUMON DNA AMP PROBE: CPT

## 2017-05-25 PROCEDURE — 81001 URINALYSIS AUTO W/SCOPE: CPT

## 2017-05-25 PROCEDURE — 93970 EXTREMITY STUDY: CPT

## 2017-05-25 PROCEDURE — 99285 EMERGENCY DEPT VISIT HI MDM: CPT | Mod: 25

## 2017-05-25 PROCEDURE — 83550 IRON BINDING TEST: CPT

## 2017-05-25 PROCEDURE — 76536 US EXAM OF HEAD AND NECK: CPT

## 2017-05-25 PROCEDURE — 82947 ASSAY GLUCOSE BLOOD QUANT: CPT

## 2017-05-25 PROCEDURE — 82330 ASSAY OF CALCIUM: CPT

## 2017-05-25 PROCEDURE — 85027 COMPLETE CBC AUTOMATED: CPT

## 2017-05-25 PROCEDURE — 74230 X-RAY XM SWLNG FUNCJ C+: CPT

## 2017-05-25 PROCEDURE — 84484 ASSAY OF TROPONIN QUANT: CPT

## 2017-05-25 PROCEDURE — 82553 CREATINE MB FRACTION: CPT

## 2017-05-25 PROCEDURE — 82435 ASSAY OF BLOOD CHLORIDE: CPT

## 2017-05-25 PROCEDURE — 85730 THROMBOPLASTIN TIME PARTIAL: CPT

## 2017-05-25 PROCEDURE — 87633 RESP VIRUS 12-25 TARGETS: CPT

## 2017-05-25 PROCEDURE — 82728 ASSAY OF FERRITIN: CPT

## 2017-05-25 PROCEDURE — 84145 PROCALCITONIN (PCT): CPT

## 2017-05-25 PROCEDURE — 84443 ASSAY THYROID STIM HORMONE: CPT

## 2017-05-25 PROCEDURE — 71045 X-RAY EXAM CHEST 1 VIEW: CPT

## 2017-05-25 PROCEDURE — 97165 OT EVAL LOW COMPLEX 30 MIN: CPT

## 2017-05-25 PROCEDURE — 82550 ASSAY OF CK (CPK): CPT

## 2017-05-25 PROCEDURE — 92611 MOTION FLUOROSCOPY/SWALLOW: CPT

## 2017-05-25 PROCEDURE — 72125 CT NECK SPINE W/O DYE: CPT

## 2017-05-25 PROCEDURE — 87086 URINE CULTURE/COLONY COUNT: CPT

## 2017-05-25 PROCEDURE — 97110 THERAPEUTIC EXERCISES: CPT

## 2017-05-25 PROCEDURE — 87486 CHLMYD PNEUM DNA AMP PROBE: CPT

## 2017-05-25 PROCEDURE — 87040 BLOOD CULTURE FOR BACTERIA: CPT

## 2017-05-25 PROCEDURE — 70450 CT HEAD/BRAIN W/O DYE: CPT

## 2017-05-25 PROCEDURE — 99231 SBSQ HOSP IP/OBS SF/LOW 25: CPT

## 2017-05-25 PROCEDURE — 71275 CT ANGIOGRAPHY CHEST: CPT

## 2017-05-25 RX ORDER — TAMSULOSIN HYDROCHLORIDE 0.4 MG/1
1 CAPSULE ORAL
Qty: 0 | Refills: 0 | COMMUNITY
Start: 2017-05-25

## 2017-05-25 RX ORDER — TAMSULOSIN HYDROCHLORIDE 0.4 MG/1
1 CAPSULE ORAL
Qty: 0 | Refills: 0 | COMMUNITY

## 2017-05-25 RX ORDER — LEVOTHYROXINE SODIUM 125 MCG
1 TABLET ORAL
Qty: 0 | Refills: 0 | COMMUNITY
Start: 2017-05-25

## 2017-05-25 RX ORDER — ERYTHROPOIETIN 10000 [IU]/ML
0 INJECTION, SOLUTION INTRAVENOUS; SUBCUTANEOUS
Qty: 0 | Refills: 0 | COMMUNITY

## 2017-05-25 RX ORDER — OXYCODONE HYDROCHLORIDE 5 MG/1
1 TABLET ORAL
Qty: 0 | Refills: 0 | COMMUNITY
Start: 2017-05-25

## 2017-05-25 RX ORDER — LEVOTHYROXINE SODIUM 125 MCG
1 TABLET ORAL
Qty: 0 | Refills: 0 | COMMUNITY

## 2017-05-25 RX ADMIN — ENOXAPARIN SODIUM 40 MILLIGRAM(S): 100 INJECTION SUBCUTANEOUS at 11:24

## 2017-05-25 RX ADMIN — Medication 75 MICROGRAM(S): at 05:06

## 2017-07-13 ENCOUNTER — APPOINTMENT (OUTPATIENT)
Dept: NEUROLOGY | Facility: CLINIC | Age: 82
End: 2017-07-13

## 2017-08-28 ENCOUNTER — APPOINTMENT (OUTPATIENT)
Dept: NEUROLOGY | Facility: CLINIC | Age: 82
End: 2017-08-28

## 2017-11-15 ENCOUNTER — APPOINTMENT (OUTPATIENT)
Dept: CT IMAGING | Facility: CLINIC | Age: 82
End: 2017-11-15

## 2017-11-15 ENCOUNTER — APPOINTMENT (OUTPATIENT)
Dept: NEUROLOGY | Facility: CLINIC | Age: 82
End: 2017-11-15
Payer: MEDICARE

## 2017-11-15 VITALS — SYSTOLIC BLOOD PRESSURE: 102 MMHG | DIASTOLIC BLOOD PRESSURE: 61 MMHG | HEART RATE: 102 BPM

## 2017-11-15 DIAGNOSIS — G95.9 DISEASE OF SPINAL CORD, UNSPECIFIED: ICD-10-CM

## 2017-11-15 DIAGNOSIS — M48.062 SPINAL STENOSIS, LUMBAR REGION WITH NEUROGENIC CLAUDICATION: ICD-10-CM

## 2017-11-15 DIAGNOSIS — R41.0 DISORIENTATION, UNSPECIFIED: ICD-10-CM

## 2017-11-15 PROCEDURE — 99214 OFFICE O/P EST MOD 30 MIN: CPT

## 2017-11-15 RX ORDER — CALCIUM CARBONATE/VITAMIN D3 600 MG-10
TABLET ORAL
Refills: 0 | Status: ACTIVE | COMMUNITY

## 2018-01-24 ENCOUNTER — APPOINTMENT (OUTPATIENT)
Dept: NEUROLOGY | Facility: CLINIC | Age: 83
End: 2018-01-24

## 2018-05-31 NOTE — H&P ADULT. - SURGICAL SITE INCISION
Date of Discharge: 05/31/2018     Principle Diagnosis: BPH    Secondary Diagnosis:  has a past medical history of Aphakia of right eye; Cataract; DM (diabetes mellitus), type 2 with complications; Hearing loss of aging; History of gout; History of pulmonary embolism (2011); Hypertension associated with diabetes; Iridodialysis of right eye; Obesity, unspecified; and Type 2 diabetes mellitus with polyneuropathy.    History of Present Illness: Pt was worked up in clinic and today's procedure was scheduled.  Please see H&P for full details.    Hospital Course: Pt presented on the day of surgery and after proper consents were obtained he was brought to the OR where his procedure was performed without difficulty.    Discharge Disposition: Home    Followup Plan:  1. Pt is provided with medications for pain and others as indicated.  2. RTC in 2 weeks    
no

## 2018-07-16 PROBLEM — G62.9 POLYNEUROPATHY, UNSPECIFIED: Chronic | Status: ACTIVE | Noted: 2017-04-28

## 2019-08-15 NOTE — PHYSICAL THERAPY INITIAL EVALUATION ADULT - MANUAL MUSCLE TESTING RESULTS, REHAB EVAL
LVM for callback to inform patient that [facility] received prescription for Ajovy and prior authorization is required.  [facility] will be back in touch once insurance determination is received.     grossly 3/5 t/o extremities/grossly assessed due to

## 2020-03-16 NOTE — PATIENT PROFILE ADULT. - PAIN SCALE PREFERRED, PROFILE
numerical 0-10

## 2021-09-29 NOTE — OCCUPATIONAL THERAPY INITIAL EVALUATION ADULT - BATHING, PREVIOUS LEVEL OF FUNCTION, OT EVAL
independent Glycopyrrolate Pregnancy And Lactation Text: This medication is Pregnancy Category B and is considered safe during pregnancy. It is unknown if it is excreted breast milk.

## 2023-02-01 NOTE — PATIENT PROFILE ADULT. - --DESCRIBE SURGICAL SITE
Return if you change your mind or have further symptoms.  The heart cause of your chest pain cannot be excluded from the testing done.    Follow-up with Dr. Nazario for possible further testing   
anterior left neck

## 2023-10-27 NOTE — H&P PST ADULT - PRIMARY CARE PROVIDER
[facility] [phone]
disoriented to time/disoriented to situation

## 2024-06-20 NOTE — PATIENT PROFILE ADULT. - BLOOD TRANSFUSION, PREVIOUS, PROFILE
What Type Of Note Output Would You Prefer (Optional)?: Bullet Format Hpi Title: Evaluation of Skin Lesions Have Your Spot(S) Been Treated In The Past?: has not been treated no

## 2025-04-15 NOTE — PRE-OP CHECKLIST - BSA (M2)
No more antibiotics needed at this time, we just need to control the cough. It is common to experience prolonged coughing after a viral infection. Cough is likely due to post nasal drip so I am sending antihistamine to be taken at night time before bed. Also sending benzonatate and more promethazine-DM to be taken as needed for cough. Be sure to increase water intake and drink warm tea with honey to help with cough. Also consider elevating head on pillow when sleeping at night to help with sinus drip. Meds sent to [facility] in [place].    2.08